# Patient Record
Sex: MALE | Race: WHITE | NOT HISPANIC OR LATINO | Employment: OTHER | URBAN - METROPOLITAN AREA
[De-identification: names, ages, dates, MRNs, and addresses within clinical notes are randomized per-mention and may not be internally consistent; named-entity substitution may affect disease eponyms.]

---

## 2017-08-05 ENCOUNTER — HOSPITAL ENCOUNTER (EMERGENCY)
Facility: HOSPITAL | Age: 62
Discharge: HOME | End: 2017-08-05
Attending: EMERGENCY MEDICINE
Payer: MEDICAID

## 2017-08-05 VITALS
OXYGEN SATURATION: 97 % | DIASTOLIC BLOOD PRESSURE: 67 MMHG | BODY MASS INDEX: 27.12 KG/M2 | WEIGHT: 200 LBS | HEART RATE: 96 BPM | TEMPERATURE: 99 F | SYSTOLIC BLOOD PRESSURE: 116 MMHG | RESPIRATION RATE: 19 BRPM

## 2017-08-05 DIAGNOSIS — I95.9 HYPOTENSION, UNSPECIFIED HYPOTENSION TYPE: ICD-10-CM

## 2017-08-05 DIAGNOSIS — J20.9 ACUTE BRONCHITIS, UNSPECIFIED ORGANISM: Primary | ICD-10-CM

## 2017-08-05 DIAGNOSIS — J44.1 COPD EXACERBATION: ICD-10-CM

## 2017-08-05 PROBLEM — I10 ESSENTIAL HYPERTENSION: Status: ACTIVE | Noted: 2017-08-05

## 2017-08-05 PROBLEM — E78.5 HYPERLIPIDEMIA: Status: ACTIVE | Noted: 2017-08-05

## 2017-08-05 PROBLEM — F17.200 SMOKER: Status: ACTIVE | Noted: 2017-08-05

## 2017-08-05 LAB
ALBUMIN SERPL BCP-MCNC: 3.3 G/DL
ALP SERPL-CCNC: 78 U/L
ALT SERPL W/O P-5'-P-CCNC: 19 U/L
ANION GAP SERPL CALC-SCNC: 11 MMOL/L
APTT BLDCRRT: 30.6 SEC
AST SERPL-CCNC: 32 U/L
BASOPHILS # BLD AUTO: 0.02 K/UL
BASOPHILS NFR BLD: 0.4 %
BILIRUB SERPL-MCNC: 0.2 MG/DL
BILIRUB UR QL STRIP: NEGATIVE
BNP SERPL-MCNC: 52 PG/ML
BNP SERPL-MCNC: 52 PG/ML
BUN SERPL-MCNC: 28 MG/DL
CALCIUM SERPL-MCNC: 8.3 MG/DL
CHLORIDE SERPL-SCNC: 103 MMOL/L
CLARITY UR: CLEAR
CO2 SERPL-SCNC: 24 MMOL/L
COLOR UR: YELLOW
CREAT SERPL-MCNC: 1.4 MG/DL
DIFFERENTIAL METHOD: ABNORMAL
EOSINOPHIL # BLD AUTO: 0 K/UL
EOSINOPHIL NFR BLD: 0.4 %
ERYTHROCYTE [DISTWIDTH] IN BLOOD BY AUTOMATED COUNT: 17.9 %
EST. GFR  (AFRICAN AMERICAN): >60 ML/MIN/1.73 M^2
EST. GFR  (NON AFRICAN AMERICAN): 53 ML/MIN/1.73 M^2
FLUAV AG SPEC QL IA: NEGATIVE
FLUBV AG SPEC QL IA: NEGATIVE
GLUCOSE SERPL-MCNC: 116 MG/DL
GLUCOSE UR QL STRIP: NEGATIVE
HCT VFR BLD AUTO: 36.3 %
HGB BLD-MCNC: 11.2 G/DL
HGB UR QL STRIP: NEGATIVE
INR PPP: 1.1
KETONES UR QL STRIP: NEGATIVE
LACTATE SERPL-SCNC: 0.7 MMOL/L
LACTATE SERPL-SCNC: 0.7 MMOL/L
LEUKOCYTE ESTERASE UR QL STRIP: NEGATIVE
LIPASE SERPL-CCNC: 68 U/L
LYMPHOCYTES # BLD AUTO: 1.9 K/UL
LYMPHOCYTES NFR BLD: 36.2 %
MAGNESIUM SERPL-MCNC: 1.8 MG/DL
MCH RBC QN AUTO: 26.9 PG
MCHC RBC AUTO-ENTMCNC: 30.9 G/DL
MCV RBC AUTO: 87 FL
MONOCYTES # BLD AUTO: 0.9 K/UL
MONOCYTES NFR BLD: 17 %
NEUTROPHILS # BLD AUTO: 2.4 K/UL
NEUTROPHILS NFR BLD: 46 %
NITRITE UR QL STRIP: NEGATIVE
PH UR STRIP: 6 [PH] (ref 5–8)
PHOSPHATE SERPL-MCNC: 3.7 MG/DL
PLATELET # BLD AUTO: 127 K/UL
PMV BLD AUTO: 10.5 FL
POTASSIUM SERPL-SCNC: 4.6 MMOL/L
PROT SERPL-MCNC: 6.8 G/DL
PROT UR QL STRIP: NEGATIVE
PROTHROMBIN TIME: 10.9 SEC
RBC # BLD AUTO: 4.16 M/UL
SODIUM SERPL-SCNC: 138 MMOL/L
SP GR UR STRIP: 1.02 (ref 1–1.03)
SPECIMEN SOURCE: NORMAL
TROPONIN I SERPL DL<=0.01 NG/ML-MCNC: 0.01 NG/ML
TSH SERPL DL<=0.005 MIU/L-ACNC: 2.1 UIU/ML
URN SPEC COLLECT METH UR: NORMAL
UROBILINOGEN UR STRIP-ACNC: NEGATIVE EU/DL
WBC # BLD AUTO: 5.22 K/UL

## 2017-08-05 PROCEDURE — 87040 BLOOD CULTURE FOR BACTERIA: CPT

## 2017-08-05 PROCEDURE — 83690 ASSAY OF LIPASE: CPT

## 2017-08-05 PROCEDURE — 87086 URINE CULTURE/COLONY COUNT: CPT

## 2017-08-05 PROCEDURE — 25000003 PHARM REV CODE 250: Performed by: EMERGENCY MEDICINE

## 2017-08-05 PROCEDURE — 84484 ASSAY OF TROPONIN QUANT: CPT

## 2017-08-05 PROCEDURE — 25000242 PHARM REV CODE 250 ALT 637 W/ HCPCS: Performed by: EMERGENCY MEDICINE

## 2017-08-05 PROCEDURE — 85610 PROTHROMBIN TIME: CPT

## 2017-08-05 PROCEDURE — 84443 ASSAY THYROID STIM HORMONE: CPT

## 2017-08-05 PROCEDURE — 87400 INFLUENZA A/B EACH AG IA: CPT

## 2017-08-05 PROCEDURE — 83735 ASSAY OF MAGNESIUM: CPT

## 2017-08-05 PROCEDURE — 83880 ASSAY OF NATRIURETIC PEPTIDE: CPT

## 2017-08-05 PROCEDURE — 93010 ELECTROCARDIOGRAM REPORT: CPT | Mod: ,,, | Performed by: INTERNAL MEDICINE

## 2017-08-05 PROCEDURE — 85730 THROMBOPLASTIN TIME PARTIAL: CPT

## 2017-08-05 PROCEDURE — 84100 ASSAY OF PHOSPHORUS: CPT

## 2017-08-05 PROCEDURE — 85025 COMPLETE CBC W/AUTO DIFF WBC: CPT

## 2017-08-05 PROCEDURE — 63600175 PHARM REV CODE 636 W HCPCS: Performed by: EMERGENCY MEDICINE

## 2017-08-05 PROCEDURE — 81003 URINALYSIS AUTO W/O SCOPE: CPT

## 2017-08-05 PROCEDURE — 83605 ASSAY OF LACTIC ACID: CPT

## 2017-08-05 PROCEDURE — 80053 COMPREHEN METABOLIC PANEL: CPT

## 2017-08-05 PROCEDURE — 96374 THER/PROPH/DIAG INJ IV PUSH: CPT

## 2017-08-05 PROCEDURE — 99285 EMERGENCY DEPT VISIT HI MDM: CPT | Mod: 25

## 2017-08-05 PROCEDURE — 94640 AIRWAY INHALATION TREATMENT: CPT

## 2017-08-05 PROCEDURE — 96361 HYDRATE IV INFUSION ADD-ON: CPT

## 2017-08-05 RX ORDER — GUAIFENESIN 600 MG/1
600 TABLET, EXTENDED RELEASE ORAL 2 TIMES DAILY
Status: DISCONTINUED | OUTPATIENT
Start: 2017-08-05 | End: 2017-08-05 | Stop reason: HOSPADM

## 2017-08-05 RX ORDER — LOVASTATIN 20 MG/1
40 TABLET ORAL NIGHTLY
Status: CANCELLED | OUTPATIENT
Start: 2017-08-05

## 2017-08-05 RX ORDER — LISINOPRIL 20 MG/1
20 TABLET ORAL DAILY
Status: CANCELLED | OUTPATIENT
Start: 2017-08-06

## 2017-08-05 RX ORDER — IPRATROPIUM BROMIDE AND ALBUTEROL SULFATE 2.5; .5 MG/3ML; MG/3ML
3 SOLUTION RESPIRATORY (INHALATION) EVERY 6 HOURS
Status: CANCELLED | OUTPATIENT
Start: 2017-08-05

## 2017-08-05 RX ORDER — FORMOTEROL FUMARATE DIHYDRATE 20 UG/2ML
20 SOLUTION RESPIRATORY (INHALATION) EVERY 12 HOURS
Status: CANCELLED | OUTPATIENT
Start: 2017-08-05

## 2017-08-05 RX ORDER — CARVEDILOL 12.5 MG/1
25 TABLET ORAL 2 TIMES DAILY WITH MEALS
Status: CANCELLED | OUTPATIENT
Start: 2017-08-05

## 2017-08-05 RX ORDER — PREDNISONE 20 MG/1
40 TABLET ORAL DAILY
Qty: 10 TABLET | Refills: 0 | Status: SHIPPED | OUTPATIENT
Start: 2017-08-05 | End: 2017-08-10

## 2017-08-05 RX ORDER — AMLODIPINE BESYLATE 10 MG/1
10 TABLET ORAL DAILY
COMMUNITY
End: 2018-05-14

## 2017-08-05 RX ORDER — IBUPROFEN 200 MG
1 TABLET ORAL DAILY
Status: DISCONTINUED | OUTPATIENT
Start: 2017-08-05 | End: 2017-08-05 | Stop reason: HOSPADM

## 2017-08-05 RX ORDER — SODIUM CHLORIDE 9 MG/ML
INJECTION, SOLUTION INTRAVENOUS CONTINUOUS
Status: CANCELLED | OUTPATIENT
Start: 2017-08-05

## 2017-08-05 RX ORDER — METHYLPREDNISOLONE SOD SUCC 125 MG
125 VIAL (EA) INJECTION
Status: COMPLETED | OUTPATIENT
Start: 2017-08-05 | End: 2017-08-05

## 2017-08-05 RX ORDER — CETIRIZINE HYDROCHLORIDE 10 MG/1
10 TABLET ORAL DAILY
Qty: 30 TABLET | Refills: 0 | Status: SHIPPED | OUTPATIENT
Start: 2017-08-05 | End: 2018-05-14

## 2017-08-05 RX ORDER — PANTOPRAZOLE SODIUM 40 MG/1
40 TABLET, DELAYED RELEASE ORAL DAILY
Status: CANCELLED | OUTPATIENT
Start: 2017-08-06

## 2017-08-05 RX ORDER — ACETAMINOPHEN 500 MG
1000 TABLET ORAL
Status: COMPLETED | OUTPATIENT
Start: 2017-08-05 | End: 2017-08-05

## 2017-08-05 RX ORDER — ACETAMINOPHEN 325 MG/1
650 TABLET ORAL EVERY 4 HOURS PRN
Status: CANCELLED | OUTPATIENT
Start: 2017-08-05

## 2017-08-05 RX ORDER — MOXIFLOXACIN HYDROCHLORIDE 400 MG/1
400 TABLET ORAL DAILY
Qty: 10 TABLET | Refills: 0 | Status: SHIPPED | OUTPATIENT
Start: 2017-08-05 | End: 2017-08-15

## 2017-08-05 RX ORDER — AMLODIPINE BESYLATE 5 MG/1
10 TABLET ORAL DAILY
Status: CANCELLED | OUTPATIENT
Start: 2017-08-06

## 2017-08-05 RX ORDER — SODIUM CHLORIDE 0.9 % (FLUSH) 0.9 %
3 SYRINGE (ML) INJECTION EVERY 8 HOURS
Status: CANCELLED | OUTPATIENT
Start: 2017-08-05

## 2017-08-05 RX ORDER — ENOXAPARIN SODIUM 100 MG/ML
40 INJECTION SUBCUTANEOUS EVERY 24 HOURS
Status: CANCELLED | OUTPATIENT
Start: 2017-08-05

## 2017-08-05 RX ORDER — IPRATROPIUM BROMIDE AND ALBUTEROL SULFATE 2.5; .5 MG/3ML; MG/3ML
3 SOLUTION RESPIRATORY (INHALATION)
Status: COMPLETED | OUTPATIENT
Start: 2017-08-05 | End: 2017-08-05

## 2017-08-05 RX ORDER — FAMOTIDINE 20 MG/1
20 TABLET, FILM COATED ORAL 2 TIMES DAILY
Status: CANCELLED | OUTPATIENT
Start: 2017-08-05

## 2017-08-05 RX ORDER — ALBUTEROL SULFATE 90 UG/1
1-2 AEROSOL, METERED RESPIRATORY (INHALATION) EVERY 6 HOURS PRN
Qty: 1 INHALER | Refills: 0 | Status: SHIPPED | OUTPATIENT
Start: 2017-08-05 | End: 2018-05-14

## 2017-08-05 RX ORDER — PANTOPRAZOLE SODIUM 40 MG/1
40 TABLET, DELAYED RELEASE ORAL DAILY
COMMUNITY
End: 2019-02-07

## 2017-08-05 RX ORDER — CLONIDINE HYDROCHLORIDE 0.1 MG/1
0.1 TABLET ORAL 2 TIMES DAILY
Status: CANCELLED | OUTPATIENT
Start: 2017-08-05

## 2017-08-05 RX ADMIN — IPRATROPIUM BROMIDE AND ALBUTEROL SULFATE 3 ML: .5; 3 SOLUTION RESPIRATORY (INHALATION) at 06:08

## 2017-08-05 RX ADMIN — METHYLPREDNISOLONE SODIUM SUCCINATE 125 MG: 125 INJECTION, POWDER, FOR SOLUTION INTRAMUSCULAR; INTRAVENOUS at 04:08

## 2017-08-05 RX ADMIN — ACETAMINOPHEN 1000 MG: 500 TABLET ORAL at 04:08

## 2017-08-05 RX ADMIN — SODIUM CHLORIDE 2721 ML: 0.9 INJECTION, SOLUTION INTRAVENOUS at 04:08

## 2017-08-05 NOTE — ED NOTES
Np at bedside at this time. Patient claims he does not want to be admitted into the hospital. Patient is now being discharged ama

## 2017-08-05 NOTE — ASSESSMENT & PLAN NOTE
oxygen therapy to maintain sats > 92 %, formoterol treatments, Solumedrol 80 mg IV every 8 hours, Avelox and Duo Nebs  Advised smoking cessation  Will need home nebulizer - consult  for assistance

## 2017-08-05 NOTE — ED PROVIDER NOTES
SCRIBE #1 NOTE: I, Dasha Ryder, am scribing for, and in the presence of, Jose Alvarez MD. I have scribed the entire note.      History      Chief Complaint   Patient presents with    Shortness of Breath     increased shortness of breath, body aches since being diagnosed with pneumonia 2 weeks ago       Review of patient's allergies indicates:  No Known Allergies     HPI   HPI    8/5/2017, 3:56 PM   History obtained from the patient      History of Present Illness: Filemon Aranda is a 61 y.o. male patient who presents to the Emergency Department for SOB which onset gradually yesterday. Symptoms are constant and moderate in severity. Pt reports that he had an episode of watery diarrhea. Recently dx with pneumonia. Pt recently finished Levaquin. No mitigating or exacerbating factors reported. Associated sxs include intermittent chills, myalgias (mostly in torso), and diarrhea. Pt reports that he has been using his nebulizer and he took all of his BP medications today. Patient denies any fever, cough, leg swelling, palpitations, nausea, vomiting, abdominal pain, CP, dizziness, and all other sxs at this time. No further complaints or concerns at this time.         Arrival mode: Personal vehicle    PCP: Jacob Ferreira MD       Past Medical History:  Past Medical History:   Diagnosis Date    COPD (chronic obstructive pulmonary disease)     Hypercholesteremia     Hypertension     Insomnia     Renal disorder     kidney stones       Past Surgical History:  Past Surgical History:   Procedure Laterality Date    KIDNEY STONE SURGERY           Family History:  Family History   Problem Relation Age of Onset    Stroke Mother     Heart disease Father     Hypertension Father     Dementia Father     Heart disease Brother     Diabetes Brother     Heart disease Brother     Hypertension Brother        Social History:  Social History     Social History Main Topics    Smoking status: Current Every Day Smoker      Packs/day: 0.50     Types: Cigarettes    Smokeless tobacco: Never Used    Alcohol use Yes    Drug use: No    Sexual activity: Not given       ROS   Review of Systems   Constitutional: Positive for chills. Negative for diaphoresis and fever.   HENT: Negative for sore throat.    Respiratory: Positive for cough (productive), shortness of breath and wheezing.    Cardiovascular: Negative for chest pain and leg swelling.   Gastrointestinal: Positive for diarrhea. Negative for abdominal pain, nausea and vomiting.   Genitourinary: Negative for dysuria.   Musculoskeletal: Negative for back pain.   Skin: Negative for rash.   Neurological: Negative for dizziness, weakness and numbness.   Hematological: Does not bruise/bleed easily.       Physical Exam      Initial Vitals   BP Pulse Resp Temp SpO2   08/05/17 1530 08/05/17 1530 08/05/17 1530 08/05/17 1537 08/05/17 1530   101/67 103 (!) 22 98.5 °F (36.9 °C) 100 %      MAP       08/05/17 1530       78.33          Physical Exam  Nursing Notes and Vital Signs Reviewed.  Constitutional: Patient is in no apparent distress. Well-developed and well-nourished.  Head: Atraumatic. Normocephalic.  Eyes: PERRL. EOM intact. Conjunctivae are not pale. No scleral icterus.  ENT: Mucous membranes are moist. Oropharynx is clear and symmetric.    Neck: Supple. Full ROM. No lymphadenopathy.  Cardiovascular: Regular rate. Regular rhythm. No murmurs, rubs, or gallops. Distal pulses are 2+ and symmetric.  Pulmonary/Chest: Expiratory wheezes bilaterally with poor air movement. No rhonchi no rales.   Abdominal: Soft and non-distended.  There is no tenderness.  No rebound, guarding, or rigidity. Good bowel sounds.  Genitourinary: No CVA tenderness  Musculoskeletal: Moves all extremities. No obvious deformities. No edema. No calf tenderness.  Skin: Warm and dry.  Neurological:  Alert, awake, and appropriate.  Normal speech.  No acute focal neurological deficits are appreciated.  Psychiatric: Normal  affect. Good eye contact. Appropriate in content.    ED Course    Procedures  ED Vital Signs:  Vitals:    08/05/17 1530 08/05/17 1537 08/05/17 1541 08/05/17 1617   BP: 101/67   105/60   Pulse: 103   101   Resp: (!) 22   17   Temp:  98.5 °F (36.9 °C)     SpO2: 100%   98%   Weight:   90.7 kg (200 lb)     08/05/17 1632 08/05/17 1702 08/05/17 1732 08/05/17 1815   BP: 109/61 (!) 123/91 119/63    Pulse: 92 91 95 96   Resp: 19  20 14   Temp:       SpO2: 100% 100% 99% 96%   Weight:        08/05/17 1820 08/05/17 1824 08/05/17 1902   BP:   116/67   Pulse: 99 98 96   Resp: 14 14 19   Temp:      SpO2:   97%   Weight:          Abnormal Lab Results:  Labs Reviewed   CBC W/ AUTO DIFFERENTIAL - Abnormal; Notable for the following:        Result Value    RBC 4.16 (*)     Hemoglobin 11.2 (*)     Hematocrit 36.3 (*)     MCH 26.9 (*)     MCHC 30.9 (*)     RDW 17.9 (*)     Platelets 127 (*)     Mono% 17.0 (*)     All other components within normal limits   COMPREHENSIVE METABOLIC PANEL - Abnormal; Notable for the following:     Glucose 116 (*)     BUN, Bld 28 (*)     Calcium 8.3 (*)     Albumin 3.3 (*)     eGFR if non  53 (*)     All other components within normal limits   LIPASE - Abnormal; Notable for the following:     Lipase 68 (*)     All other components within normal limits   CULTURE, BLOOD   CULTURE, BLOOD   CULTURE, URINE   LACTIC ACID, PLASMA   TROPONIN I   B-TYPE NATRIURETIC PEPTIDE   APTT   B-TYPE NATRIURETIC PEPTIDE   LACTIC ACID, PLASMA   MAGNESIUM   PHOSPHORUS   PROTIME-INR   TSH   URINALYSIS   INFLUENZA A AND B ANTIGEN        All Lab Results:  Results for orders placed or performed during the hospital encounter of 08/05/17   CBC auto differential   Result Value Ref Range    WBC 5.22 3.90 - 12.70 K/uL    RBC 4.16 (L) 4.60 - 6.20 M/uL    Hemoglobin 11.2 (L) 14.0 - 18.0 g/dL    Hematocrit 36.3 (L) 40.0 - 54.0 %    MCV 87 82 - 98 fL    MCH 26.9 (L) 27.0 - 31.0 pg    MCHC 30.9 (L) 32.0 - 36.0 g/dL    RDW 17.9  (H) 11.5 - 14.5 %    Platelets 127 (L) 150 - 350 K/uL    MPV 10.5 9.2 - 12.9 fL    Gran # 2.4 1.8 - 7.7 K/uL    Lymph # 1.9 1.0 - 4.8 K/uL    Mono # 0.9 0.3 - 1.0 K/uL    Eos # 0.0 0.0 - 0.5 K/uL    Baso # 0.02 0.00 - 0.20 K/uL    Gran% 46.0 38.0 - 73.0 %    Lymph% 36.2 18.0 - 48.0 %    Mono% 17.0 (H) 4.0 - 15.0 %    Eosinophil% 0.4 0.0 - 8.0 %    Basophil% 0.4 0.0 - 1.9 %    Differential Method Automated    Comprehensive metabolic panel   Result Value Ref Range    Sodium 138 136 - 145 mmol/L    Potassium 4.6 3.5 - 5.1 mmol/L    Chloride 103 95 - 110 mmol/L    CO2 24 23 - 29 mmol/L    Glucose 116 (H) 70 - 110 mg/dL    BUN, Bld 28 (H) 8 - 23 mg/dL    Creatinine 1.4 0.5 - 1.4 mg/dL    Calcium 8.3 (L) 8.7 - 10.5 mg/dL    Total Protein 6.8 6.0 - 8.4 g/dL    Albumin 3.3 (L) 3.5 - 5.2 g/dL    Total Bilirubin 0.2 0.1 - 1.0 mg/dL    Alkaline Phosphatase 78 55 - 135 U/L    AST 32 10 - 40 U/L    ALT 19 10 - 44 U/L    Anion Gap 11 8 - 16 mmol/L    eGFR if African American >60 >60 mL/min/1.73 m^2    eGFR if non African American 53 (A) >60 mL/min/1.73 m^2   Lactic acid, plasma   Result Value Ref Range    Lactate (Lactic Acid) 0.7 0.5 - 2.2 mmol/L   Troponin I   Result Value Ref Range    Troponin I 0.011 0.000 - 0.026 ng/mL   B-Type natriuretic peptide (BNP)   Result Value Ref Range    BNP 52 0 - 99 pg/mL   APTT   Result Value Ref Range    aPTT 30.6 21.0 - 32.0 sec   Brain natriuretic peptide   Result Value Ref Range    BNP 52 0 - 99 pg/mL   Lactic acid, plasma #1   Result Value Ref Range    Lactate (Lactic Acid) 0.7 0.5 - 2.2 mmol/L   Lipase   Result Value Ref Range    Lipase 68 (H) 4 - 60 U/L   Magnesium   Result Value Ref Range    Magnesium 1.8 1.6 - 2.6 mg/dL   Phosphorus   Result Value Ref Range    Phosphorus 3.7 2.7 - 4.5 mg/dL   Protime-INR   Result Value Ref Range    Prothrombin Time 10.9 9.0 - 12.5 sec    INR 1.1 0.8 - 1.2   TSH   Result Value Ref Range    TSH 2.099 0.400 - 4.000 uIU/mL   Urinalysis   Result Value Ref  Range    Specimen UA Urine, Clean Catch     Color, UA Yellow Yellow, Straw, Sarah    Appearance, UA Clear Clear    pH, UA 6.0 5.0 - 8.0    Specific Gravity, UA 1.025 1.005 - 1.030    Protein, UA Negative Negative    Glucose, UA Negative Negative    Ketones, UA Negative Negative    Bilirubin (UA) Negative Negative    Occult Blood UA Negative Negative    Nitrite, UA Negative Negative    Urobilinogen, UA Negative <2.0 EU/dL    Leukocytes, UA Negative Negative   Influenza antigen Nasopharyngeal Swab   Result Value Ref Range    Influenza A Ag, EIA Negative Negative    Influenza B Ag, EIA Negative Negative    Flu A & B Source Nasopharyngeal Swab          Imaging Results:  Imaging Results          X-Ray Chest AP Portable (SOB) (Final result)  Result time 08/05/17 16:49:01    Final result by Isabela Vann MD (08/05/17 16:49:01)                 Impression:     No acute cardiopulmonary disease.            Electronically signed by: ISABELA VANN MD  Date:     08/05/17  Time:    16:49              Narrative:    Exam: XR CHEST AP PORTABLE    Clinical History: Shortness of breath. SOB    Findings:     The lungs are clear. The cardiac silhouette is within normal limits.                             The EKG was ordered, reviewed, and independently interpreted by the ED provider.  Interpretation time: 15:37  Rate: 107 BPM  Rhythm: sinus tachycardia  Interpretation: Non specific ST and T wave abnorrmalities. No STEMI.             The Emergency Provider reviewed the vital signs and test results, which are outlined above.    ED Discussion     5:15 PM: Re-evaluated pt. I have discussed test results, shared treatment plan, and the need for admission with patient and family at bedside. Pt and family express understanding at this time and agree with all information. All questions answered. Pt and family have no further questions or concerns at this time. Pt is ready for admit.      6:15 PM: Discussed case with Rosalba ([facility]).  Dr. Sanders agrees with current care and management of pt and accepts admission.   Admitting Service: Hospital medicine   Admitting Physician: Dr. Sanders  Admit to: OBS-Tele    6:48 PM: Pt is A&O x3, appropriate and competent at this time. Pt voices desire to leave hospital. D/w pt in length need for further evaluation and treatment due to HPI and PEx. Concern over pt feeling ill with low BP and possible sepsis discussed.  HM in the ED evaluating pt and agrees he needs to stay.  Pt declines any further evaluation or tx at this time - wife and children want him to stay but he refuses. All risks, including worsening sx, permanent bodily harm and death, were discussed in length. Pt acknowledges all risk at this time and agrees to sign AMA form. Pt given RTER instructions. All questions and concerns addressed at this time. Pt leaving AMA.             ED Medication(s):  Medications   sodium chloride 0.9% bolus 2,721 mL (0 mL/kg × 90.7 kg Intravenous Stopped 8/5/17 1656)   albuterol-ipratropium 2.5mg-0.5mg/3mL nebulizer solution 3 mL (3 mLs Nebulization Given 8/5/17 7650)   methylPREDNISolone sodium succinate injection 125 mg (125 mg Intravenous Given 8/5/17 1622)   acetaminophen tablet 1,000 mg (1,000 mg Oral Given 8/5/17 1622)       Discharge Medication List as of 8/5/2017  7:00 PM      START taking these medications    Details   albuterol 90 mcg/actuation inhaler Inhale 1-2 puffs into the lungs every 6 (six) hours as needed for Wheezing (coughing)., Starting Sat 8/5/2017, Until Sun 8/5/2018, Print      cetirizine (ZYRTEC) 10 MG tablet Take 1 tablet (10 mg total) by mouth once daily., Starting Sat 8/5/2017, Until Sun 8/5/2018, Print      moxifloxacin (AVELOX) 400 mg tablet Take 1 tablet (400 mg total) by mouth once daily., Starting Sat 8/5/2017, Until Tue 8/15/2017, Print      predniSONE (DELTASONE) 20 MG tablet Take 2 tablets (40 mg total) by mouth once daily., Starting Sat 8/5/2017, Until Thu 8/10/2017, Print              Follow-up Information     Jacob Ferreira MD. Schedule an appointment as soon as possible for a visit in 2 days.    Specialty:  Family Medicine  Why:  Follow up with your primary doctor in the next 2-3 days for a re-check.  Return to the emergency department for any worsening signs or symptoms.  Contact information:  [address]  [facility] [address]  [phone]                     Medical Decision Making    Medical Decision Making:   Clinical Tests:   Lab Tests: Reviewed and Ordered  Radiological Study: Reviewed and Ordered  Medical Tests: Reviewed and Ordered           Scribe Attestation:   Scribe #1: I performed the above scribed service and the documentation accurately describes the services I performed. I attest to the accuracy of the note.    Attending:   Physician Attestation Statement for Scribe #1: I, Jose Alvarez MD, personally performed the services described in this documentation, as scribed by Dasha Ryder, in my presence, and it is both accurate and complete.          Clinical Impression       ICD-10-CM ICD-9-CM   1. Acute bronchitis, unspecified organism J20.9 466.0   2. COPD exacerbation J44.1 491.21   3. Hypotension, unspecified hypotension type I95.9 458.9       Disposition:   Disposition: AMA  Condition: Fair         Jose Alvarez MD  08/06/17 1027

## 2017-08-07 LAB — BACTERIA UR CULT: NO GROWTH

## 2017-08-11 LAB
BACTERIA BLD CULT: NORMAL
BACTERIA BLD CULT: NORMAL

## 2018-05-08 ENCOUNTER — HOSPITAL ENCOUNTER (EMERGENCY)
Facility: HOSPITAL | Age: 63
Discharge: HOME | End: 2018-05-08
Attending: EMERGENCY MEDICINE
Payer: MEDICAID

## 2018-05-08 VITALS
DIASTOLIC BLOOD PRESSURE: 74 MMHG | TEMPERATURE: 99 F | HEART RATE: 87 BPM | SYSTOLIC BLOOD PRESSURE: 153 MMHG | OXYGEN SATURATION: 98 % | HEIGHT: 71 IN | RESPIRATION RATE: 22 BRPM

## 2018-05-08 DIAGNOSIS — S50.01XA CONTUSION OF RIGHT ELBOW, INITIAL ENCOUNTER: Primary | ICD-10-CM

## 2018-05-08 DIAGNOSIS — M25.561 RIGHT KNEE PAIN: ICD-10-CM

## 2018-05-08 DIAGNOSIS — S80.01XA CONTUSION OF RIGHT KNEE, INITIAL ENCOUNTER: ICD-10-CM

## 2018-05-08 DIAGNOSIS — M25.521 RIGHT ELBOW PAIN: ICD-10-CM

## 2018-05-08 DIAGNOSIS — J40 BRONCHITIS: ICD-10-CM

## 2018-05-08 DIAGNOSIS — M25.571 RIGHT ANKLE PAIN: ICD-10-CM

## 2018-05-08 DIAGNOSIS — E86.0 DEHYDRATION: ICD-10-CM

## 2018-05-08 DIAGNOSIS — S96.911A STRAIN OF RIGHT ANKLE, INITIAL ENCOUNTER: ICD-10-CM
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
## 2018-05-08 DIAGNOSIS — I95.9 HYPOTENSION: ICD-10-CM

## 2018-05-08 LAB
ALBUMIN SERPL BCP-MCNC: 3.8 G/DL
ALP SERPL-CCNC: 85 U/L
ALT SERPL W/O P-5'-P-CCNC: 13 U/L
ANION GAP SERPL CALC-SCNC: 8 MMOL/L
ANISOCYTOSIS BLD QL SMEAR: SLIGHT
AST SERPL-CCNC: 22 U/L
BASOPHILS # BLD AUTO: 0.04 K/UL
BASOPHILS NFR BLD: 0.3 %
BILIRUB SERPL-MCNC: 0.7 MG/DL
BNP SERPL-MCNC: 49 PG/ML
BUN SERPL-MCNC: 32 MG/DL
CALCIUM SERPL-MCNC: 8.8 MG/DL
CHLORIDE SERPL-SCNC: 100 MMOL/L
CO2 SERPL-SCNC: 29 MMOL/L
CREAT SERPL-MCNC: 1.9 MG/DL
DIFFERENTIAL METHOD: ABNORMAL
EOSINOPHIL # BLD AUTO: 0.1 K/UL
EOSINOPHIL NFR BLD: 1.1 %
ERYTHROCYTE [DISTWIDTH] IN BLOOD BY AUTOMATED COUNT: 18.9 %
EST. GFR  (AFRICAN AMERICAN): 42 ML/MIN/1.73 M^2
EST. GFR  (NON AFRICAN AMERICAN): 37 ML/MIN/1.73 M^2
GLUCOSE SERPL-MCNC: 105 MG/DL (ref 70–110)
GLUCOSE SERPL-MCNC: 107 MG/DL
HCT VFR BLD AUTO: 32.1 %
HGB BLD-MCNC: 8.7 G/DL
HYPOCHROMIA BLD QL SMEAR: ABNORMAL
LYMPHOCYTES # BLD AUTO: 2.3 K/UL
LYMPHOCYTES NFR BLD: 17 %
MCH RBC QN AUTO: 21.4 PG
MCHC RBC AUTO-ENTMCNC: 27.1 G/DL
MCV RBC AUTO: 79 FL
MONOCYTES # BLD AUTO: 2.1 K/UL
MONOCYTES NFR BLD: 15.4 %
NEUTROPHILS # BLD AUTO: 8.8 K/UL
NEUTROPHILS NFR BLD: 66.2 %
OVALOCYTES BLD QL SMEAR: ABNORMAL
PLATELET # BLD AUTO: 235 K/UL
PLATELET BLD QL SMEAR: ABNORMAL
PMV BLD AUTO: 10 FL
POCT GLUCOSE: 105 MG/DL (ref 70–110)
POIKILOCYTOSIS BLD QL SMEAR: SLIGHT
POLYCHROMASIA BLD QL SMEAR: ABNORMAL
POTASSIUM SERPL-SCNC: 4.8 MMOL/L
PROT SERPL-MCNC: 7.5 G/DL
RBC # BLD AUTO: 4.07 M/UL
SODIUM SERPL-SCNC: 137 MMOL/L
STOMATOCYTES BLD QL SMEAR: PRESENT
TARGETS BLD QL SMEAR: ABNORMAL
TROPONIN I SERPL DL<=0.01 NG/ML-MCNC: <0.006 NG/ML
TROPONIN I SERPL DL<=0.01 NG/ML-MCNC: <0.006 NG/ML
WBC # BLD AUTO: 13.27 K/UL

## 2018-05-08 PROCEDURE — 96360 HYDRATION IV INFUSION INIT: CPT

## 2018-05-08 PROCEDURE — 82962 GLUCOSE BLOOD TEST: CPT

## 2018-05-08 PROCEDURE — 94640 AIRWAY INHALATION TREATMENT: CPT

## 2018-05-08 PROCEDURE — 84484 ASSAY OF TROPONIN QUANT: CPT | Mod: 91

## 2018-05-08 PROCEDURE — 93010 ELECTROCARDIOGRAM REPORT: CPT | Mod: ,,, | Performed by: INTERNAL MEDICINE

## 2018-05-08 PROCEDURE — 25000003 PHARM REV CODE 250: Performed by: EMERGENCY MEDICINE

## 2018-05-08 PROCEDURE — 25000242 PHARM REV CODE 250 ALT 637 W/ HCPCS: Performed by: EMERGENCY MEDICINE

## 2018-05-08 PROCEDURE — 85025 COMPLETE CBC W/AUTO DIFF WBC: CPT

## 2018-05-08 PROCEDURE — 99285 EMERGENCY DEPT VISIT HI MDM: CPT | Mod: 25

## 2018-05-08 PROCEDURE — 93005 ELECTROCARDIOGRAM TRACING: CPT

## 2018-05-08 PROCEDURE — 83880 ASSAY OF NATRIURETIC PEPTIDE: CPT

## 2018-05-08 PROCEDURE — 80053 COMPREHEN METABOLIC PANEL: CPT

## 2018-05-08 RX ORDER — IPRATROPIUM BROMIDE AND ALBUTEROL SULFATE 2.5; .5 MG/3ML; MG/3ML
3 SOLUTION RESPIRATORY (INHALATION)
Status: COMPLETED | OUTPATIENT
Start: 2018-05-08 | End: 2018-05-08

## 2018-05-08 RX ORDER — AZITHROMYCIN 250 MG/1
TABLET, FILM COATED ORAL
Qty: 6 TABLET | Refills: 0 | Status: SHIPPED | OUTPATIENT
Start: 2018-05-08 | End: 2018-05-14

## 2018-05-08 RX ORDER — METHYLPREDNISOLONE 4 MG/1
TABLET ORAL
Qty: 1 PACKAGE | Refills: 0 | Status: SHIPPED | OUTPATIENT
Start: 2018-05-08 | End: 2018-05-14

## 2018-05-08 RX ORDER — ACETAMINOPHEN 500 MG
1000 TABLET ORAL
Status: COMPLETED | OUTPATIENT
Start: 2018-05-08 | End: 2018-05-08

## 2018-05-08 RX ORDER — ASPIRIN 325 MG
325 TABLET ORAL
Status: COMPLETED | OUTPATIENT
Start: 2018-05-08 | End: 2018-05-08

## 2018-05-08 RX ADMIN — ACETAMINOPHEN 1000 MG: 500 TABLET, FILM COATED ORAL at 11:05

## 2018-05-08 RX ADMIN — ASPIRIN 325 MG ORAL TABLET 325 MG: 325 PILL ORAL at 11:05

## 2018-05-08 RX ADMIN — IPRATROPIUM BROMIDE AND ALBUTEROL SULFATE 3 ML: .5; 3 SOLUTION RESPIRATORY (INHALATION) at 11:05

## 2018-05-08 RX ADMIN — SODIUM CHLORIDE 500 ML: 0.9 INJECTION, SOLUTION INTRAVENOUS at 11:05

## 2018-05-08 NOTE — ED PROVIDER NOTES
"SCRIBE #1 NOTE: I, Corinne Mack, am scribing for, and in the presence of, Thiago Bee Jr., MD. I have scribed the entire note.      History      Chief Complaint   Patient presents with    Hypotension     hypotension and syncope last evening.        Review of patient's allergies indicates:  No Known Allergies     HPI   HPI    5/8/2018, 11:08 AM   History obtained from the patient      History of Present Illness: Filemon Aranda is a 62 y.o. male patient with PMHx of COPD and HTN who presents to the Emergency Department for LOC x2 which onset suddenly yesterday. Pt reports that he was walking yesterday when he "blacked out" and fell onto his R side. Pt's wife helped him up and tried to get him to the bathroom and the pt "blacked out again half way down the hallway". Pt's wife called 911 and EMS found that the pt was hypotensive at 60/30. Pt refused to seek further medical Tx at that time. Symptoms are episodic and moderate in severity. No mitigating or exacerbating factors reported. Associated sxs include R ankle pain, R foot pain, R elbow pain, and R knee pain. Patient denies any fever, chills, CP, SOB, N/V/D, back pain, neck pain, HA, dizziness, and all other sxs at this time. No prior Tx reported. Pt takes multiple BC powders daily. No further complaints or concerns at this time.         Arrival mode: Personal vehicle    PCP: Jacob Ferreira MD       Past Medical History:  Past Medical History:   Diagnosis Date    COPD (chronic obstructive pulmonary disease)     Hypercholesteremia     Hypertension     Insomnia     Renal disorder     kidney stones       Past Surgical History:  Past Surgical History:   Procedure Laterality Date    KIDNEY STONE SURGERY           Family History:  Family History   Problem Relation Age of Onset    Stroke Mother     Heart disease Father     Hypertension Father     Dementia Father     Heart disease Brother     Diabetes Brother     Heart disease Brother     Hypertension " Brother        Social History:  Social History     Social History Main Topics    Smoking status: Current Every Day Smoker     Packs/day: 0.50     Types: Cigarettes    Smokeless tobacco: Never Used    Alcohol use Yes    Drug use: No    Sexual activity: Not on file       ROS   Review of Systems   Constitutional: Negative for chills and fever.   HENT: Negative for facial swelling.         (-) head injury   Respiratory: Negative for cough and shortness of breath.    Cardiovascular: Negative for chest pain and leg swelling.        (+) hypotension   Gastrointestinal: Negative for abdominal pain, diarrhea, nausea and vomiting.   Musculoskeletal: Positive for arthralgias (R ankle; R knee; R elbow) and myalgias (R foot). Negative for back pain, neck pain and neck stiffness.   Skin: Negative for rash and wound.   Neurological: Negative for dizziness, light-headedness, numbness and headaches.        (+) LOC x2   All other systems reviewed and are negative.    Physical Exam      Initial Vitals [05/08/18 1046]   BP Pulse Resp Temp SpO2   118/66 107 20 98.8 °F (37.1 °C) 95 %      MAP       83.33          Physical Exam  Nursing Notes and Vital Signs Reviewed.  Constitutional: Patient is in no apparent distress. Well-developed and well-nourished.  Head: Atraumatic. Normocephalic.  Eyes: PERRL. EOM intact. Conjunctivae are not pale. No scleral icterus.  ENT: Mucous membranes are moist. Oropharynx is clear and symmetric.    Neck: Supple. Full ROM. No lymphadenopathy.  Cardiovascular: Tachycardic. Regular rhythm. No murmurs, rubs, or gallops. Distal pulses are 2+ and symmetric.  Pulmonary/Chest: No respiratory distress. Diffuse wheezes bilaterally. No rales.  Abdominal: Soft and non-distended.  There is no tenderness.  No rebound, guarding, or rigidity.   Musculoskeletal: Moves all extremities. No obvious deformities. No edema. No calf tenderness. Mild edema to the lateral aspect of the R ankle with LROM secondary to pain.  "Bruising to the R forearm. Superficial abrasions to the R elbow. No deep lacerations requiring sutures or active bleeding. Pt is grossly neurovascularly intact with brisk capillary refill.  Skin: Warm and dry.  Neurological:  Alert, awake, and appropriate.  Normal speech.  No acute focal neurological deficits are appreciated.  Psychiatric: Normal affect. Good eye contact. Appropriate in content.    ED Course    Procedures  ED Vital Signs:  Vitals:    05/08/18 1046 05/08/18 1147 05/08/18 1158 05/08/18 1159   BP: 118/66  (!) 125/57 136/60   Pulse: 107 97 92 96   Resp: 20 16     Temp: 98.8 °F (37.1 °C)      TempSrc: Oral      SpO2: 95% 97%     Height: 5' 11" (1.803 m)       05/08/18 1202 05/08/18 1230 05/08/18 1446   BP: (!) 102/56 (!) 153/74    Pulse: 104 90 87   Resp:  (!) 25 (!) 22   Temp:      TempSrc:      SpO2:  97% 98%   Height:          Abnormal Lab Results:  Labs Reviewed   CBC W/ AUTO DIFFERENTIAL - Abnormal; Notable for the following:        Result Value    WBC 13.27 (*)     RBC 4.07 (*)     Hemoglobin 8.7 (*)     Hematocrit 32.1 (*)     MCV 79 (*)     MCH 21.4 (*)     MCHC 27.1 (*)     RDW 18.9 (*)     Gran # (ANC) 8.8 (*)     Mono # 2.1 (*)     Lymph% 17.0 (*)     Mono% 15.4 (*)     All other components within normal limits   COMPREHENSIVE METABOLIC PANEL - Abnormal; Notable for the following:     BUN, Bld 32 (*)     Creatinine 1.9 (*)     eGFR if  42 (*)     eGFR if non  37 (*)     All other components within normal limits   POCT GLUCOSE MONITORING CONTINUOUS - Normal   TROPONIN I   TROPONIN I   B-TYPE NATRIURETIC PEPTIDE   POCT GLUCOSE        All Lab Results:  Results for orders placed or performed during the hospital encounter of 05/08/18   CBC auto differential   Result Value Ref Range    WBC 13.27 (H) 3.90 - 12.70 K/uL    RBC 4.07 (L) 4.60 - 6.20 M/uL    Hemoglobin 8.7 (L) 14.0 - 18.0 g/dL    Hematocrit 32.1 (L) 40.0 - 54.0 %    MCV 79 (L) 82 - 98 fL    MCH 21.4 (L) " 27.0 - 31.0 pg    MCHC 27.1 (L) 32.0 - 36.0 g/dL    RDW 18.9 (H) 11.5 - 14.5 %    Platelets 235 150 - 350 K/uL    MPV 10.0 9.2 - 12.9 fL    Gran # (ANC) 8.8 (H) 1.8 - 7.7 K/uL    Lymph # 2.3 1.0 - 4.8 K/uL    Mono # 2.1 (H) 0.3 - 1.0 K/uL    Eos # 0.1 0.0 - 0.5 K/uL    Baso # 0.04 0.00 - 0.20 K/uL    Gran% 66.2 38.0 - 73.0 %    Lymph% 17.0 (L) 18.0 - 48.0 %    Mono% 15.4 (H) 4.0 - 15.0 %    Eosinophil% 1.1 0.0 - 8.0 %    Basophil% 0.3 0.0 - 1.9 %    Platelet Estimate Appears normal     Aniso Slight     Poik Slight     Poly Occasional     Hypo Marked     Ovalocytes Occasional     Target Cells Occasional     Stomatocytes Present     Differential Method Automated    Comprehensive metabolic panel   Result Value Ref Range    Sodium 137 136 - 145 mmol/L    Potassium 4.8 3.5 - 5.1 mmol/L    Chloride 100 95 - 110 mmol/L    CO2 29 23 - 29 mmol/L    Glucose 107 70 - 110 mg/dL    BUN, Bld 32 (H) 8 - 23 mg/dL    Creatinine 1.9 (H) 0.5 - 1.4 mg/dL    Calcium 8.8 8.7 - 10.5 mg/dL    Total Protein 7.5 6.0 - 8.4 g/dL    Albumin 3.8 3.5 - 5.2 g/dL    Total Bilirubin 0.7 0.1 - 1.0 mg/dL    Alkaline Phosphatase 85 55 - 135 U/L    AST 22 10 - 40 U/L    ALT 13 10 - 44 U/L    Anion Gap 8 8 - 16 mmol/L    eGFR if African American 42 (A) >60 mL/min/1.73 m^2    eGFR if non African American 37 (A) >60 mL/min/1.73 m^2   Troponin I #1   Result Value Ref Range    Troponin I <0.006 0.000 - 0.026 ng/mL   Troponin I #2   Result Value Ref Range    Troponin I <0.006 0.000 - 0.026 ng/mL   B-Type natriuretic peptide (BNP)   Result Value Ref Range    BNP 49 0 - 99 pg/mL   POCT glucose   Result Value Ref Range    POC Glucose 105 70 - 110 mg/dL   POCT glucose   Result Value Ref Range    POCT Glucose 105 70 - 110 mg/dL       Imaging Results:  Imaging Results          CT Cervical Spine Without Contrast (Final result)  Result time 05/08/18 13:31:42    Final result by Kee Vazquez MD (05/08/18 13:31:42)                 Impression:      Degenerative  changes as above.    Small amount of fluid is seen within the inferior right mastoid air cells without a discrete fracture identified.  Findings could reflect chronic mastoid air cell disease.    All CT scans at this facility use dose modulation, iterative reconstruction, and/or weight base dosing when appropriate to reduce radiation dose to as low as reasonably achievable.      Electronically signed by: Kee Vazquez MD  Date:    05/08/2018  Time:    13:31             Narrative:    EXAMINATION:  CT CERVICAL SPINE WITHOUT CONTRAST    CLINICAL HISTORY:  fall;    TECHNIQUE:  2.5 mm axial noncontrast CT images were obtained through the cervical spine using soft tissue and bony out rhythms.  Sagittal and coronal reformats were also submitted for interpretation.    COMPARISON:  CT head 05/08/2018    FINDINGS:  There is reversal of the normal cervical lordosis which can be seen with patient positioning or muscular spasm.  Vertebral body heights are well-maintained. Intervertebral disc space height is maintained. No fractures are identified. Prevertebral soft tissues are unremarkable.    Small amount of fluid is seen within the inferior right mastoid air cells without a discrete fracture identified.  Findings could reflect chronic mastoid air cell disease.    There is multi-level degenerative disc disease with the greatest level of involvement being the C4/5 through C6/7 levels with small posterior disc osteophyte complexes and uncovertebral hypertrophy    Please note that routine CT of the spine is limited in its evaluation of extradural/epidural disease.                               CT Head Without Contrast (Final result)  Result time 05/08/18 13:20:34    Final result by Kee Vazquez MD (05/08/18 13:20:34)                 Impression:      Chronic microvascular ischemic changes.    Small amount of fluid is seen within the inferior right mastoid air cells without a discrete fracture identified.  Findings could reflect  chronic mastoid air cell disease.      Electronically signed by: Kee Vazquez MD  Date:    05/08/2018  Time:    13:20             Narrative:    EXAMINATION:  CT HEAD WITHOUT CONTRAST    CLINICAL HISTORY:  fall; Unspecified fall, initial encounter    TECHNIQUE:  Low dose axial CT images obtained throughout the head without intravenous contrast. Sagittal and coronal reconstructions were performed.  All CT scans at this facility use dose modulation, iterative reconstruction and/or weight based dosing when appropriate to reduce radiation dose to as low as reasonably achievable.    COMPARISON:  None.    FINDINGS:  Intracranial compartment:    The brain parenchyma demonstrates areas of decreased attenuation with mild to moderate periventricular white matter consistent with chronic microvascular ischemic changes..  No parenchymal mass, hemorrhage, edema or major vascular distribution infarct.  Vascular calcifications are noted.    Mild prominence of the sulci and ventricles are consistent with age-related involutional changes.    No extra-axial blood or fluid collections.    Skull/extracranial contents (limited evaluation): No fracture. Paranasal sinuses are essentially clear.    Small amount of fluid is seen within the inferior right mastoid air cells without a discrete fracture identified                               X-Ray Foot Complete Right (Final result)  Result time 05/08/18 11:50:49    Final result by Kee Vazquez MD (05/08/18 11:50:49)                 Impression:      No acute fracture or dislocation.      Electronically signed by: Kee Vazquez MD  Date:    05/08/2018  Time:    11:50             Narrative:    EXAMINATION:  XR FOOT COMPLETE 3 VIEW RIGHT    CLINICAL HISTORY:  XR FOOT COMPLETE 3 VIEW RIGHTPain in right ankle and joints of right foot    FINDINGS:  Three views of the right foot were obtained.    No evidence of acute fracture or dislocation.  Bony mineralization is normal.  Diffuse soft tissue  swelling and mild degenerative changes.                               X-Ray Elbow Complete Right (Final result)  Result time 05/08/18 11:47:11    Final result by Kee Vazquez MD (05/08/18 11:47:11)                 Impression:      No acute fracture or dislocation.      Electronically signed by: Kee Vazquez MD  Date:    05/08/2018  Time:    11:47             Narrative:    EXAMINATION:  XR ELBOW COMPLETE 3 VIEW RIGHT    CLINICAL HISTORY:  XR ELBOW COMPLETE 3 VIEW RIGHTPain in right elbow    FINDINGS:  Two views of the left elbow were obtained.    No evidence of acute fracture or dislocation.  Bony mineralization is normal.  Soft tissues are unremarkable.   No joint effusion                               X-Ray Knee Complete 4 Or More Views Right (Final result)  Result time 05/08/18 11:47:46    Final result by Kee Vazquez MD (05/08/18 11:47:46)                 Impression:      No acute fracture or dislocation.      Electronically signed by: Kee Vazquez MD  Date:    05/08/2018  Time:    11:47             Narrative:    EXAMINATION:  XR KNEE COMP 4 OR MORE VIEWS RIGHT    CLINICAL HISTORY:  Pain in right knee    FINDINGS:  Results:  No evidence of acute fracture or dislocation.  Bony mineralization is normal.  Soft tissues are unremarkable. Lateral view of the right knee demonstrates trace joint effusion.    Mild tricompartment degenerative changes, greatest within the medial compartment.  Vascular calcifications are noted.                               X-Ray Ankle Complete Right (Final result)  Result time 05/08/18 11:51:11    Final result by Kee Vazquez MD (05/08/18 11:51:11)                 Impression:      No acute fracture or dislocation.      Electronically signed by: Kee Vazquez MD  Date:    05/08/2018  Time:    11:51             Narrative:    EXAMINATION:  XR ANKLE COMPLETE 3 VIEW RIGHT    CLINICAL HISTORY:  XR ANKLE COMPLETE 3 VIEW RIGHTPain in right ankle and joints of right  foot    FINDINGS:  Three views of the right ankle were obtained.    No evidence of acute fracture or dislocation.  Bony mineralization is normal.  Mild soft tissue swelling.                               X-Ray Chest AP Portable (Final result)  Result time 05/08/18 11:46:44    Final result by Kee Vazquez MD (05/08/18 11:46:44)                 Impression:      No acute process seen.      Electronically signed by: Kee Vazquez MD  Date:    05/08/2018  Time:    11:46             Narrative:    EXAMINATION:  XR CHEST AP PORTABLE    CLINICAL HISTORY:  hypotension;    FINDINGS:  Single view of the chest.    Cardiac silhouette is normal.  Aorta demonstrates atherosclerotic disease.  The lungs demonstrate no evidence of active disease.  No evidence of pleural effusion or pneumothorax.  Bones appear intact.                                 The EKG was ordered, reviewed, and independently interpreted by the ED provider.  Interpretation time: 1116  Rate: 102 BPM  Rhythm: sinus tachycardia  Interpretation: No STEMI.             The Emergency Provider reviewed the vital signs and test results, which are outlined above.    ED Discussion     2:23 PM: Reassessed pt at this time. Pt is awake, alert, and in no distress.  No hypotensive episodes while in ER, pt is orthostatic prior to administration of fluids.  This has resolved. Offered pt admission, which the pt declines at this time. Discussed with pt all pertinent ED information and results. Discussed pt dx and plan of tx. Gave pt all f/u and return to the ED instructions. All questions and concerns were addressed at this time. Pt expresses understanding of information and instructions, and is comfortable with plan to discharge. Pt is stable for discharge.    I discussed with patient and/or family/caretaker that evaluation in the ED does not suggest any emergent or life threatening medical conditions requiring immediate intervention beyond what was provided in the ED, and I  believe patient is safe for discharge.  Regardless, an unremarkable evaluation in the ED does not preclude the development or presence of a serious of life threatening condition. As such, patient was instructed to return immediately for any worsening or change in current symptoms.    Regarding DEHYDRATION, advised patient to call 911 if they should experience confusion, dizziness, lethargy, and/or lightheadedness.  The patient was instructed to contact their primary care provider immediately or return to the ED for any of the following symptoms: blood in the stool or vomit; diarrhea or vomiting for an extended period of time (vomiting > 24 hours or diarrhea for > 3 days); dry mouth or dry eyes; poor skin turgor; listlessness and inactiveness; tachycardia; little or no urine output for 8 hours; inability to keep fluids down; and sunken eyes.  The patient was encouraged to drink plenty of water daily and to increase water intake when weather is hot or during exercise.    Regarding CONTUSIONS, I advised patient to: REST the injured area or use it less than usual; apply ICE to decrease swelling and pain and help prevent tissue damage; use COMPRESSION with an elastic bandage to support the area and decrease swelling; ELEVATE injured body part above the level of the heart to help decrease pain and swelling; AVOID using massage or massage to acute injuries as it may slow healing of the area; AVOID drinking alcohol as it may slow healing of the injury; and avoid stretching injured muscles. Advised patient to return to the emergency department or contact primary care provider if: having trouble moving injured area; notice tingling or numbness in or near the injured area; extremity below the bruise gets cold or turns pale; a new lump develops in the injured area; symptoms do not improve with treatment after 4 to 5 days; there is any questions or concerns about the condition or treatment plan.     Regarding BRONCHITIS, for  treatment, I encouraged patient to refrain from smoking, drink plenty of fluids, rest, take medications as prescribed, and to use a humidifier or steam in the bathroom. Patient instructed to notify primary care provider if: they have a cough most days or have a cough that returns frequently; are coughing up blood; have a high fever or shaking chills; have a low-grade fever for three or more days; develop thick, greenish mucus, especially if it has a bad smell; and feel short of breath or have chest pain. For prevention, discussed with patient the importance of not smoking, getting annual influenza vaccines, reducing exposure to air pollution, and to frequently wash hands to avoid spread of infection.  Instructed patient to return to emergency department if they experience chest pain or shortness of breath and to follow up with primary care provider for re-evaluation.    ED Medication(s):  Medications   aspirin tablet 325 mg (325 mg Oral Given 5/8/18 1140)   sodium chloride 0.9% bolus 500 mL (0 mLs Intravenous Stopped 5/8/18 1256)   acetaminophen tablet 1,000 mg (1,000 mg Oral Given 5/8/18 1140)   albuterol-ipratropium 2.5mg-0.5mg/3mL nebulizer solution 3 mL (3 mLs Nebulization Given 5/8/18 1147)       Discharge Medication List as of 5/8/2018  2:26 PM      START taking these medications    Details   azithromycin (ZITHROMAX Z-MARISELA) 250 MG tablet Take z pack as directed, Print      methylPREDNISolone (MEDROL DOSEPACK) 4 mg tablet Take medrol dose pack as directed, Print             Follow-up Information     Jacob Ferreira MD. Schedule an appointment as soon as possible for a visit in 1 week.    Specialty:  Family Medicine  Contact information:  [address] [facility]   [address]  [facility] [id number] [phone]             [facility] - .    Specialty:  Emergency Medicine  Why:  As needed, If symptoms worsen  Contact information:  [address]  [facility] [address] [phone]            [facility] - Pulmonary Services. Schedule an appointment as soon as possible for a visit in 1 week.    Specialty:  Pulmonology  Contact information:  [address]  [facility] [address] [phone]  Additional information:  (off [facility]) 3th floor                   Medical Decision Making    Medical Decision Making:   Clinical Tests:   Lab Tests: Ordered and Reviewed  Radiological Study: Ordered and Reviewed  Medical Tests: Ordered and Reviewed           Scribe Attestation:   Scribe #1: I performed the above scribed service and the documentation accurately describes the services I performed. I attest to the accuracy of the note.    Attending:   Physician Attestation Statement for Scribe #1: I, Thiago Bee Jr., MD, personally performed the services described in this documentation, as scribed by Corinne Mack, in my presence, and it is both accurate and complete.          Clinical Impression       ICD-10-CM ICD-9-CM   1. Contusion of right elbow, initial encounter S50.01XA 923.11   2. Hypotension I95.9 458.9   3. Right elbow pain M25.521 719.42   4. Right knee pain M25.561 719.46   5. Right ankle pain M25.571 719.47   6. Fall W19.XXXA E888.9   7. Strain of right ankle, initial encounter S96.911A 845.00   8. Contusion of right knee, initial encounter S80.01XA 924.11   9. Bronchitis J40 490   10. Dehydration E86.0 276.51       Disposition:   Disposition: Discharged  Condition: Stable           Thiago Bee Jr., MD  05/08/18 9381

## 2018-05-08 NOTE — DISCHARGE INSTRUCTIONS
Regarding DEHYDRATION, advised patient to call 911 if they should experience confusion, dizziness, lethargy, and/or lightheadedness.  The patient was instructed to contact their primary care provider immediately or return to the ED for any of the following symptoms: blood in the stool or vomit; diarrhea or vomiting for an extended period of time (vomiting > 24 hours or diarrhea for > 3 days); dry mouth or dry eyes; poor skin turgor; listlessness and inactiveness; tachycardia; little or no urine output for 8 hours; inability to keep fluids down; and sunken eyes.  The patient was encouraged to drink plenty of water daily and to increase water intake when weather is hot or during exercise.    Regarding BRONCHITIS, for treatment, I encouraged patient to refrain from smoking, drink plenty of fluids, rest, take medications as prescribed, and to use a humidifier or steam in the bathroom. Patient instructed to notify primary care provider if: they have a cough most days or have a cough that returns frequently; are coughing up blood; have a high fever or shaking chills; have a low-grade fever for three or more days; develop thick, greenish mucus, especially if it has a bad smell; and feel short of breath or have chest pain. For prevention, discussed with patient the importance of not smoking, getting annual influenza vaccines, reducing exposure to air pollution, and to frequently wash hands to avoid spread of infection.  Instructed patient to return to emergency department if they experience chest pain or shortness of breath and to follow up with primary care provider for re-evaluation.    Regarding CONTUSIONS, I advised patient to: REST the injured area or use it less than usual; apply ICE to decrease swelling and pain and help prevent tissue damage; use COMPRESSION with an elastic bandage to support the area and decrease swelling; ELEVATE injured body part above the level of the heart to help decrease pain and swelling;  AVOID using massage or massage to acute injuries as it may slow healing of the area; AVOID drinking alcohol as it may slow healing of the injury; and avoid stretching injured muscles. Advised patient to return to the emergency department or contact primary care provider if: having trouble moving injured area; notice tingling or numbness in or near the injured area; extremity below the bruise gets cold or turns pale; a new lump develops in the injured area; symptoms do not improve with treatment after 4 to 5 days; there is any questions or concerns about the condition or treatment plan.

## 2018-05-14 ENCOUNTER — TELEPHONE (OUTPATIENT)
Dept: PULMONOLOGY | Facility: CLINIC | Age: 63
End: 2018-05-14

## 2018-05-14 ENCOUNTER — OFFICE VISIT (OUTPATIENT)
Dept: PULMONOLOGY | Facility: CLINIC | Age: 63
End: 2018-05-14
Payer: MEDICAID

## 2018-05-14 VITALS
BODY MASS INDEX: 27.87 KG/M2 | OXYGEN SATURATION: 98 % | WEIGHT: 199.06 LBS | RESPIRATION RATE: 20 BRPM | HEIGHT: 71 IN | SYSTOLIC BLOOD PRESSURE: 128 MMHG | HEART RATE: 110 BPM | DIASTOLIC BLOOD PRESSURE: 64 MMHG

## 2018-05-14 DIAGNOSIS — Z12.9 SCREENING FOR CANCER: ICD-10-CM

## 2018-05-14 DIAGNOSIS — J44.9 CHRONIC OBSTRUCTIVE PULMONARY DISEASE, UNSPECIFIED COPD TYPE: Primary | ICD-10-CM

## 2018-05-14 DIAGNOSIS — F17.210 TOBACCO DEPENDENCE DUE TO CIGARETTES: Chronic | ICD-10-CM

## 2018-05-14 PROCEDURE — 99214 OFFICE O/P EST MOD 30 MIN: CPT | Mod: PBBFAC | Performed by: NURSE PRACTITIONER

## 2018-05-14 PROCEDURE — 99204 OFFICE O/P NEW MOD 45 MIN: CPT | Mod: S$PBB,,, | Performed by: NURSE PRACTITIONER

## 2018-05-14 PROCEDURE — 99999 PR PBB SHADOW E&M-EST. PATIENT-LVL IV: CPT | Mod: PBBFAC,,, | Performed by: NURSE PRACTITIONER

## 2018-05-14 RX ORDER — PREDNISONE 20 MG/1
TABLET ORAL
Qty: 20 TABLET | Refills: 0 | Status: SHIPPED | OUTPATIENT
Start: 2018-05-14 | End: 2018-05-14 | Stop reason: SDUPTHER

## 2018-05-14 RX ORDER — ROFLUMILAST 500 UG/1
500 TABLET ORAL DAILY
Qty: 30 TABLET | Refills: 11 | Status: SHIPPED | OUTPATIENT
Start: 2018-05-14 | End: 2019-02-07

## 2018-05-14 RX ORDER — ALBUTEROL SULFATE 0.83 MG/ML
2.5 SOLUTION RESPIRATORY (INHALATION) EVERY 8 HOURS PRN
COMMUNITY
End: 2019-02-07

## 2018-05-14 RX ORDER — ALBUTEROL SULFATE 90 UG/1
2 AEROSOL, METERED RESPIRATORY (INHALATION) EVERY 4 HOURS PRN
COMMUNITY
End: 2018-05-14

## 2018-05-14 RX ORDER — PREDNISONE 20 MG/1
TABLET ORAL
Qty: 20 TABLET | Refills: 0 | Status: ON HOLD | OUTPATIENT
Start: 2018-05-14 | End: 2018-09-02 | Stop reason: SDUPTHER

## 2018-05-14 NOTE — PROGRESS NOTES
"Consult    Subjective:      Patient ID: Filemon Aranda is a 62 y.o. male.    Patient Active Problem List   Diagnosis    COPD exacerbation    Tobacco dependence due to cigarettes    Bronchitis    Essential hypertension    Hyperlipidemia    Contusion of right knee    Strain of right ankle    Contusion of right elbow    Fall    Right ankle pain    Right knee pain    Right elbow pain    Hypotension    Dehydration     Problem list has been reviewed.    he has been referred by Self, [facility] for evaluation and management for   Chief Complaint   Patient presents with    COPD    Hospital Follow Up     Chief Complaint: COPD and Hospital Follow Up    HPI:  Filemon Aranda is a 62 y.o. male presents to pulmonary clinic for ER follow up from 5/8/2018 when patient with known COPD was diagnosed bronchitis and discharged on zithromax and prednisone.  He is followed by his Primary Care Provider's office and Dr. Pickett with [facility] in past, Last seen by Primary Care Provider's last week. Last seen by pulmonologist, Dr. Pickett October 2017.   Patient canceled follow up with Dr. Pickett, since wanted to only follow up with his Primary Care Provider for COPD.   Last pft with [facility] 10/2017   Diagnosis with emphysema in about 2011.   His current inhaler medication: Albuterol nebulizer. Anoro controller. Has tried dulera, advair, symbicort, in past and did not tolerate ICS with candida over growth. He has been on incurse, spiriva in past.  Anoro began 7 days ago, prescribed by Primary Care Provider's office. Which has improved his shortness of breath with exertion.   Symptoms include productive cough of whitish bubbles, cough and wheezing daily, long standing. No purulent mucous. Shortness of breath with prolonged exertion.   Current every day smoker 1/2 pack or less/day since Jan 2018. Prior 2 pk/day non filtered x 49 years. 98 pack year cigarette smoker.   Patient had ER visit related to "passing out" a lot per " "patient and wife. He reports he has "passed out with hard coughing in past", not daily.   He completed Zithromax and prednisone, reports improved.   Exercise: no regular program. No work around the home, patient reports they call him "Mr. Newberry" around his home.     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 No occupational exposure to Asbestos, Silica,  Petrochemicals,  Fiber glass,  Grain Dust and  Pesticides. Occupational to saw dust and paint.     Avocational Exposure:   None currently.     Pet Exposures:  None currently. Denies allergy to Dog and  cat dander.    Previous Report Reviewed: ER records, lab reports, office notes and radiology reports     Past Medical History: The following portions of the patient's history were reviewed and updated as appropriate:   He  has a past surgical history that includes Kidney stone surgery.  His family history includes Dementia in his father; Diabetes in his brother; Heart disease in his brother, brother, and father; Hypertension in his brother and father; Stroke in his mother.  He  reports that he has been smoking Cigarettes.  He started smoking about 49 years ago. He has a 98.00 pack-year smoking history. He has never used smokeless tobacco. He reports that he drinks about 3.0 oz of alcohol per week . He reports that he does not use drugs.  He has a current medication list which includes the following prescription(s): albuterol, b complex vitamins, carvedilol, lisinopril-hydrochlorothiazide, lovastatin, melatonin, pantoprazole, trazodone/dietary supp. no.8, umeclidinium-vilanterol, ipratropium-albuterol, prednisone, and roflumilast.  He has No Known Allergies.    Review of Systems   Constitutional: Negative for fever, chills, weight loss, weight gain, activity change, appetite change, fatigue and night sweats.   HENT: Negative for postnasal drip, rhinorrhea, sinus pressure, voice change and congestion.    Eyes: " Negative for redness and itching.   Respiratory: Negative for snoring, cough, sputum production, chest tightness, shortness of breath, wheezing, orthopnea, asthma nighttime symptoms, dyspnea on extertion, use of rescue inhaler and somnolence.    Cardiovascular: Negative.  Negative for chest pain, palpitations and leg swelling.   Genitourinary: Negative for difficulty urinating and hematuria.   Endocrine: Negative for cold intolerance and heat intolerance.    Musculoskeletal: Negative for arthralgias, gait problem, joint swelling and myalgias.   Skin: Negative.    Gastrointestinal: Negative for nausea, vomiting, abdominal pain and acid reflux.   Neurological: Negative for dizziness, weakness, light-headedness and headaches.   Hematological: Negative for adenopathy. No excessive bruising.   All other systems reviewed and are negative.     Objective:     Physical Exam   Constitutional: He is oriented to person, place, and time. He appears well-developed and well-nourished. He is active and cooperative.  Non-toxic appearance. He does not appear ill. No distress.   HENT:   Head: Normocephalic and atraumatic.   Right Ear: External ear normal.   Left Ear: External ear normal.   Nose: Nose normal.   Mouth/Throat: Oropharynx is clear and moist. No oropharyngeal exudate.   Eyes: Conjunctivae are normal.   Neck: Normal range of motion. Neck supple.   Cardiovascular: Normal rate, regular rhythm, normal heart sounds and intact distal pulses.    Pulmonary/Chest: Effort normal. He has wheezes (diffuse expiratory, pt refused in clinic neb, wants to go home to take use his nebulizer.).   Abdominal: Soft.   Musculoskeletal: He exhibits no edema.   Neurological: He is alert and oriented to person, place, and time.   Skin: Skin is warm and dry.   Psychiatric: He has a normal mood and affect. His behavior is normal. Judgment and thought content normal.   Vitals reviewed.    Vitals:    05/14/18 1219   BP: 128/64   Pulse: 110   Resp: 20  "  SpO2: 98%   Weight: 90.3 kg (199 lb 1.2 oz)   Height: 5' 11" (1.803 m)     Estimated body mass index is 27.77 kg/m² as calculated from the following:    Height as of this encounter: 5' 11" (1.803 m).    Weight as of this encounter: 90.3 kg (199 lb 1.2 oz).    Personal Diagnostic Review  Chest xray 5/8/2018  EXAMINATION:  XR CHEST AP PORTABLE  CLINICAL HISTORY:  hypotension;  FINDINGS:  Single view of the chest.  Cardiac silhouette is normal.  Aorta demonstrates atherosclerotic disease.    The lungs demonstrate no evidence of active disease.  No evidence of pleural effusion or pneumothorax.  Bones appear intact.    Assessment:     1. Chronic obstructive pulmonary disease, unspecified COPD type    2. Tobacco dependence due to cigarettes    3. Screening for cancer      Orders Placed This Encounter   Procedures    CT Chest Lung Screening Low Dose     Standing Status:   Future     Standing Expiration Date:   5/14/2019     Order Specific Question:   Are you a current or former smoker who quit within the past 15 years?     Answer:   Yes     Order Specific Question:   Are you between age 55-77 years old?     Answer:   Yes     Order Specific Question:   Has the patient ever had lung cancer or an abnormal Chest CT?     Answer:   No     Order Specific Question:   Has the patient smoked 30 or more packs of cigarettes/year?     Answer:   Yes     Order Specific Question:   May the Radiologist modify the order per protocol to meet the clinical needs of the patient?     Answer:   Yes    Ambulatory Referral to Pulmonary Disease Management     Referral Priority:   Routine     Referral Type:   Consultation     Referral Reason:   Specialty Services Required     Requested Specialty:   Pulmonary Disease     Number of Visits Requested:   1    Complete PFT with bronchodilator     Standing Status:   Future     Standing Expiration Date:   5/14/2019    PULM - Arterial Blood Gases--in addition to PFT only     Standing Status:   Future    "  Standing Expiration Date:   5/14/2019    Stress test, pulmonary     Standing Status:   Future     Standing Expiration Date:   5/14/2019     Plan:     Discussed diagnosis, its evaluation, treatment and usual course. All questions answered.    Problem List Items Addressed This Visit     Tobacco dependence due to cigarettes (Chronic)     Weaned from 2pk x 49 years to 1/2 pk since Jan 2018  Not motivated to quit.   Declined smoking cessation, card with contact info provided.  Strong recommendation for complete smoking cessation with review of quit tips and written information per after visit summary.  The patient states knowledge of importance of quitting and multiple health risk to continued smoking.  Stated could wait to leave clinic to be able to smoke his cigarettes.              Relevant Orders    CT Chest Lung Screening Low Dose      Other Visit Diagnoses     Chronic obstructive pulmonary disease, unspecified COPD type    -  Primary    Relevant Medications    roflumilast 500 mcg Tab    ipratropium-albuterol (COMBIVENT)  mcg/actuation inhaler    predniSONE (DELTASONE) 20 MG tablet    Other Relevant Orders    Complete PFT with bronchodilator    PULM - Arterial Blood Gases--in addition to PFT only    Stress test, pulmonary    Ambulatory Referral to Pulmonary Disease Management    Screening for cancer        Relevant Orders    CT Chest Lung Screening Low Dose        Patient is symptomatic with diffuse fine expiratory wheezing on ascultation, refused in clinic neb treatment. Wants to go home to take his own albuterol nebs, reports he always has a wheeze.   Total time spent in face to face counseling and coordination of care 40 in face to face  discussion concerning diagnosis, prognosis, review of lab and test results, benefits of treatment as well as management of disease, counseling of patient and coordination of care between various health  care providers . Greater than half the time spent was used for  coordination of care and counseling of patient. Discussion with other physicians or health care providers occurred.    Follow-up in about 3 months (around 8/14/2018) for COPD w/review cpf/pul stress/abg.

## 2018-05-14 NOTE — ASSESSMENT & PLAN NOTE
Weaned from 2pk x 49 years to 1/2 pk since Jan 2018  Not motivated to quit.   Declined smoking cessation, card with contact info provided.  Strong recommendation for complete smoking cessation with review of quit tips and written information per after visit summary.  The patient states knowledge of importance of quitting and multiple health risk to continued smoking.  Stated could wait to leave clinic to be able to smoke his cigarettes.

## 2018-05-14 NOTE — TELEPHONE ENCOUNTER
Attempted to contact patient and notify patient that Rx For Prednisone escripted to [facility], no answer, LVM

## 2018-05-16 ENCOUNTER — TELEPHONE (OUTPATIENT)
Dept: PULMONOLOGY | Facility: CLINIC | Age: 63
End: 2018-05-16

## 2018-08-31 ENCOUNTER — HOSPITAL ENCOUNTER (OUTPATIENT)
Facility: HOSPITAL | Age: 63
Discharge: HOME | DRG: 871 | End: 2018-09-02
Attending: EMERGENCY MEDICINE | Admitting: INTERNAL MEDICINE
Payer: MEDICAID

## 2018-08-31 DIAGNOSIS — A41.9 SEPSIS, DUE TO UNSPECIFIED ORGANISM: ICD-10-CM

## 2018-08-31 DIAGNOSIS — R05.9 COUGH: ICD-10-CM

## 2018-08-31 DIAGNOSIS — J44.9 CHRONIC OBSTRUCTIVE PULMONARY DISEASE, UNSPECIFIED COPD TYPE: ICD-10-CM

## 2018-08-31 DIAGNOSIS — R06.02 SHORTNESS OF BREATH: ICD-10-CM

## 2018-08-31 DIAGNOSIS — J44.1 COPD EXACERBATION: Primary | ICD-10-CM

## 2018-08-31 DIAGNOSIS — J18.9 PNEUMONIA DUE TO INFECTIOUS ORGANISM, UNSPECIFIED LATERALITY, UNSPECIFIED PART OF LUNG: ICD-10-CM

## 2018-08-31 LAB
ALBUMIN SERPL BCP-MCNC: 3.1 G/DL
ALP SERPL-CCNC: 106 U/L
ALT SERPL W/O P-5'-P-CCNC: 37 U/L
ANION GAP SERPL CALC-SCNC: 11 MMOL/L
ANISOCYTOSIS BLD QL SMEAR: ABNORMAL
AST SERPL-CCNC: 61 U/L
BASOPHILS # BLD AUTO: 0.42 K/UL
BASOPHILS NFR BLD: 2.9 %
BILIRUB SERPL-MCNC: 0.4 MG/DL
BILIRUB UR QL STRIP: NEGATIVE
BUN SERPL-MCNC: 26 MG/DL
CALCIUM SERPL-MCNC: 9 MG/DL
CHLORIDE SERPL-SCNC: 97 MMOL/L
CLARITY UR: CLEAR
CO2 SERPL-SCNC: 23 MMOL/L
COLOR UR: YELLOW
CREAT SERPL-MCNC: 1 MG/DL
DACRYOCYTES BLD QL SMEAR: ABNORMAL
DIFFERENTIAL METHOD: ABNORMAL
EOSINOPHIL # BLD AUTO: 0.1 K/UL
EOSINOPHIL NFR BLD: 0.3 %
ERYTHROCYTE [DISTWIDTH] IN BLOOD BY AUTOMATED COUNT: 22.5 %
EST. GFR  (AFRICAN AMERICAN): >60 ML/MIN/1.73 M^2
EST. GFR  (NON AFRICAN AMERICAN): >60 ML/MIN/1.73 M^2
GLUCOSE SERPL-MCNC: 112 MG/DL
GLUCOSE UR QL STRIP: NEGATIVE
HCT VFR BLD AUTO: 36.8 %
HGB BLD-MCNC: 10.9 G/DL
HGB UR QL STRIP: NEGATIVE
HYPOCHROMIA BLD QL SMEAR: ABNORMAL
KETONES UR QL STRIP: NEGATIVE
LACTATE SERPL-SCNC: 1 MMOL/L
LEUKOCYTE ESTERASE UR QL STRIP: NEGATIVE
LYMPHOCYTES # BLD AUTO: 7.9 K/UL
LYMPHOCYTES NFR BLD: 55.2 %
MCH RBC QN AUTO: 25.4 PG
MCHC RBC AUTO-ENTMCNC: 29.6 G/DL
MCV RBC AUTO: 86 FL
MONOCYTES # BLD AUTO: 1 K/UL
MONOCYTES NFR BLD: 7 %
NEUTROPHILS # BLD AUTO: 5 K/UL
NEUTROPHILS NFR BLD: 35.5 %
NITRITE UR QL STRIP: NEGATIVE
OVALOCYTES BLD QL SMEAR: ABNORMAL
PH UR STRIP: 6 [PH] (ref 5–8)
PLATELET # BLD AUTO: 189 K/UL
PLATELET BLD QL SMEAR: ABNORMAL
PMV BLD AUTO: 10.6 FL
POIKILOCYTOSIS BLD QL SMEAR: SLIGHT
POLYCHROMASIA BLD QL SMEAR: ABNORMAL
POTASSIUM SERPL-SCNC: 4.7 MMOL/L
PROT SERPL-MCNC: 7.6 G/DL
PROT UR QL STRIP: NEGATIVE
RBC # BLD AUTO: 4.29 M/UL
SODIUM SERPL-SCNC: 131 MMOL/L
SP GR UR STRIP: 1.01 (ref 1–1.03)
SPHEROCYTES BLD QL SMEAR: ABNORMAL
STOMATOCYTES BLD QL SMEAR: PRESENT
TARGETS BLD QL SMEAR: ABNORMAL
TROPONIN I SERPL DL<=0.01 NG/ML-MCNC: 0.01 NG/ML
URN SPEC COLLECT METH UR: NORMAL
UROBILINOGEN UR STRIP-ACNC: NEGATIVE EU/DL
WBC # BLD AUTO: 14.38 K/UL

## 2018-08-31 PROCEDURE — 25000242 PHARM REV CODE 250 ALT 637 W/ HCPCS: Performed by: EMERGENCY MEDICINE

## 2018-08-31 PROCEDURE — 94640 AIRWAY INHALATION TREATMENT: CPT

## 2018-08-31 PROCEDURE — 87040 BLOOD CULTURE FOR BACTERIA: CPT

## 2018-08-31 PROCEDURE — 93005 ELECTROCARDIOGRAM TRACING: CPT

## 2018-08-31 PROCEDURE — 25000003 PHARM REV CODE 250: Performed by: EMERGENCY MEDICINE

## 2018-08-31 PROCEDURE — G0378 HOSPITAL OBSERVATION PER HR: HCPCS

## 2018-08-31 PROCEDURE — 36415 COLL VENOUS BLD VENIPUNCTURE: CPT

## 2018-08-31 PROCEDURE — 25500020 PHARM REV CODE 255: Performed by: INTERNAL MEDICINE

## 2018-08-31 PROCEDURE — 93010 ELECTROCARDIOGRAM REPORT: CPT | Mod: ,,, | Performed by: INTERNAL MEDICINE

## 2018-08-31 PROCEDURE — 96375 TX/PRO/DX INJ NEW DRUG ADDON: CPT | Mod: 59

## 2018-08-31 PROCEDURE — 81003 URINALYSIS AUTO W/O SCOPE: CPT

## 2018-08-31 PROCEDURE — 96365 THER/PROPH/DIAG IV INF INIT: CPT | Mod: 59

## 2018-08-31 PROCEDURE — 21400001 HC TELEMETRY ROOM

## 2018-08-31 PROCEDURE — 83605 ASSAY OF LACTIC ACID: CPT

## 2018-08-31 PROCEDURE — 84484 ASSAY OF TROPONIN QUANT: CPT

## 2018-08-31 PROCEDURE — 63600175 PHARM REV CODE 636 W HCPCS: Performed by: EMERGENCY MEDICINE

## 2018-08-31 PROCEDURE — 27000221 HC OXYGEN, UP TO 24 HOURS

## 2018-08-31 PROCEDURE — 85025 COMPLETE CBC W/AUTO DIFF WBC: CPT

## 2018-08-31 PROCEDURE — 99291 CRITICAL CARE FIRST HOUR: CPT | Mod: 25

## 2018-08-31 PROCEDURE — 80053 COMPREHEN METABOLIC PANEL: CPT

## 2018-08-31 RX ORDER — LABETALOL HYDROCHLORIDE 5 MG/ML
10 INJECTION, SOLUTION INTRAVENOUS EVERY 6 HOURS PRN
Status: DISCONTINUED | OUTPATIENT
Start: 2018-09-01 | End: 2018-09-02 | Stop reason: HOSPADM

## 2018-08-31 RX ORDER — LISINOPRIL AND HYDROCHLOROTHIAZIDE 20; 25 MG/1; MG/1
1 TABLET ORAL DAILY
Status: DISCONTINUED | OUTPATIENT
Start: 2018-09-01 | End: 2018-08-31

## 2018-08-31 RX ORDER — PANTOPRAZOLE SODIUM 40 MG/1
40 TABLET, DELAYED RELEASE ORAL DAILY
Status: DISCONTINUED | OUTPATIENT
Start: 2018-09-01 | End: 2018-09-02 | Stop reason: HOSPADM

## 2018-08-31 RX ORDER — GUAIFENESIN 100 MG/5ML
200 SOLUTION ORAL EVERY 4 HOURS PRN
Status: DISCONTINUED | OUTPATIENT
Start: 2018-09-01 | End: 2018-09-02 | Stop reason: HOSPADM

## 2018-08-31 RX ORDER — CLONIDINE HYDROCHLORIDE 0.1 MG/1
0.1 TABLET ORAL EVERY 4 HOURS PRN
Status: DISCONTINUED | OUTPATIENT
Start: 2018-09-01 | End: 2018-09-02 | Stop reason: HOSPADM

## 2018-08-31 RX ORDER — METHYLPREDNISOLONE SOD SUCC 125 MG
80 VIAL (EA) INJECTION EVERY 8 HOURS
Status: DISCONTINUED | OUTPATIENT
Start: 2018-09-01 | End: 2018-09-02 | Stop reason: HOSPADM

## 2018-08-31 RX ORDER — CLOPIDOGREL BISULFATE 75 MG/1
75 TABLET ORAL DAILY
COMMUNITY

## 2018-08-31 RX ORDER — PREDNISONE 20 MG/1
60 TABLET ORAL
Status: COMPLETED | OUTPATIENT
Start: 2018-08-31 | End: 2018-08-31

## 2018-08-31 RX ORDER — DIPHENHYDRAMINE HCL 25 MG
25 CAPSULE ORAL EVERY 6 HOURS PRN
Status: DISCONTINUED | OUTPATIENT
Start: 2018-09-01 | End: 2018-09-02 | Stop reason: HOSPADM

## 2018-08-31 RX ORDER — ACETAMINOPHEN 325 MG/1
650 TABLET ORAL EVERY 6 HOURS PRN
Status: DISCONTINUED | OUTPATIENT
Start: 2018-09-01 | End: 2018-09-01

## 2018-08-31 RX ORDER — IPRATROPIUM BROMIDE AND ALBUTEROL SULFATE 2.5; .5 MG/3ML; MG/3ML
3 SOLUTION RESPIRATORY (INHALATION) EVERY 6 HOURS
Status: DISCONTINUED | OUTPATIENT
Start: 2018-09-01 | End: 2018-09-02 | Stop reason: HOSPADM

## 2018-08-31 RX ORDER — ONDANSETRON 2 MG/ML
4 INJECTION INTRAMUSCULAR; INTRAVENOUS
Status: COMPLETED | OUTPATIENT
Start: 2018-08-31 | End: 2018-08-31

## 2018-08-31 RX ORDER — VANCOMYCIN HCL IN 5 % DEXTROSE 1G/250ML
1000 PLASTIC BAG, INJECTION (ML) INTRAVENOUS
Status: COMPLETED | OUTPATIENT
Start: 2018-08-31 | End: 2018-08-31

## 2018-08-31 RX ORDER — CLOPIDOGREL BISULFATE 75 MG/1
75 TABLET ORAL DAILY
Status: DISCONTINUED | OUTPATIENT
Start: 2018-09-01 | End: 2018-09-02 | Stop reason: HOSPADM

## 2018-08-31 RX ORDER — SODIUM CHLORIDE 9 MG/ML
INJECTION, SOLUTION INTRAVENOUS CONTINUOUS
Status: ACTIVE | OUTPATIENT
Start: 2018-09-01 | End: 2018-09-02

## 2018-08-31 RX ORDER — HYDROCHLOROTHIAZIDE 25 MG/1
25 TABLET ORAL DAILY
Status: DISCONTINUED | OUTPATIENT
Start: 2018-09-01 | End: 2018-08-31

## 2018-08-31 RX ORDER — IPRATROPIUM BROMIDE AND ALBUTEROL SULFATE 2.5; .5 MG/3ML; MG/3ML
3 SOLUTION RESPIRATORY (INHALATION)
Status: COMPLETED | OUTPATIENT
Start: 2018-08-31 | End: 2018-08-31

## 2018-08-31 RX ORDER — ONDANSETRON 2 MG/ML
4 INJECTION INTRAMUSCULAR; INTRAVENOUS EVERY 8 HOURS PRN
Status: DISCONTINUED | OUTPATIENT
Start: 2018-09-01 | End: 2018-09-02 | Stop reason: HOSPADM

## 2018-08-31 RX ORDER — IBUPROFEN 200 MG
1 TABLET ORAL DAILY
Status: DISCONTINUED | OUTPATIENT
Start: 2018-09-01 | End: 2018-09-02 | Stop reason: HOSPADM

## 2018-08-31 RX ORDER — CARVEDILOL 12.5 MG/1
25 TABLET ORAL 2 TIMES DAILY WITH MEALS
Status: DISCONTINUED | OUTPATIENT
Start: 2018-09-01 | End: 2018-09-02 | Stop reason: HOSPADM

## 2018-08-31 RX ORDER — LISINOPRIL 20 MG/1
20 TABLET ORAL DAILY
Status: DISCONTINUED | OUTPATIENT
Start: 2018-09-01 | End: 2018-08-31

## 2018-08-31 RX ORDER — ENOXAPARIN SODIUM 100 MG/ML
40 INJECTION SUBCUTANEOUS EVERY 24 HOURS
Status: DISCONTINUED | OUTPATIENT
Start: 2018-08-31 | End: 2018-09-02 | Stop reason: HOSPADM

## 2018-08-31 RX ORDER — CELECOXIB 100 MG/1
100 CAPSULE ORAL 2 TIMES DAILY
Status: DISCONTINUED | OUTPATIENT
Start: 2018-08-31 | End: 2018-09-02 | Stop reason: HOSPADM

## 2018-08-31 RX ORDER — LOVASTATIN 20 MG/1
40 TABLET ORAL NIGHTLY
Status: DISCONTINUED | OUTPATIENT
Start: 2018-09-01 | End: 2018-09-02 | Stop reason: HOSPADM

## 2018-08-31 RX ORDER — MAG HYDROX/ALUMINUM HYD/SIMETH 200-200-20
30 SUSPENSION, ORAL (FINAL DOSE FORM) ORAL EVERY 6 HOURS PRN
Status: DISCONTINUED | OUTPATIENT
Start: 2018-09-01 | End: 2018-09-02 | Stop reason: HOSPADM

## 2018-08-31 RX ORDER — ZOLPIDEM TARTRATE 5 MG/1
5 TABLET ORAL NIGHTLY PRN
Status: DISCONTINUED | OUTPATIENT
Start: 2018-09-01 | End: 2018-09-02 | Stop reason: HOSPADM

## 2018-08-31 RX ADMIN — IPRATROPIUM BROMIDE AND ALBUTEROL SULFATE 3 ML: .5; 3 SOLUTION RESPIRATORY (INHALATION) at 08:08

## 2018-08-31 RX ADMIN — PIPERACILLIN AND TAZOBACTAM 4.5 G: 4; .5 INJECTION, POWDER, LYOPHILIZED, FOR SOLUTION INTRAVENOUS; PARENTERAL at 08:08

## 2018-08-31 RX ADMIN — PREDNISONE 60 MG: 20 TABLET ORAL at 08:08

## 2018-08-31 RX ADMIN — SODIUM CHLORIDE 2673 ML: 0.9 INJECTION, SOLUTION INTRAVENOUS at 08:08

## 2018-08-31 RX ADMIN — VANCOMYCIN HYDROCHLORIDE 1000 MG: 1 INJECTION, POWDER, LYOPHILIZED, FOR SOLUTION INTRAVENOUS at 09:08

## 2018-08-31 RX ADMIN — IPRATROPIUM BROMIDE AND ALBUTEROL SULFATE 3 ML: .5; 3 SOLUTION RESPIRATORY (INHALATION) at 10:08

## 2018-08-31 RX ADMIN — IOHEXOL 75 ML: 350 INJECTION, SOLUTION INTRAVENOUS at 11:08

## 2018-08-31 RX ADMIN — ONDANSETRON HYDROCHLORIDE 4 MG: 2 INJECTION, SOLUTION INTRAMUSCULAR; INTRAVENOUS at 08:08

## 2018-09-01 LAB
ALBUMIN SERPL BCP-MCNC: 2.7 G/DL
ALP SERPL-CCNC: 84 U/L
ALT SERPL W/O P-5'-P-CCNC: 30 U/L
ANION GAP SERPL CALC-SCNC: 7 MMOL/L
ANISOCYTOSIS BLD QL SMEAR: ABNORMAL
AST SERPL-CCNC: 44 U/L
BASOPHILS # BLD AUTO: 0.13 K/UL
BASOPHILS NFR BLD: 1.7 %
BILIRUB SERPL-MCNC: 0.3 MG/DL
BUN SERPL-MCNC: 18 MG/DL
CALCIUM SERPL-MCNC: 8.3 MG/DL
CHLORIDE SERPL-SCNC: 104 MMOL/L
CO2 SERPL-SCNC: 23 MMOL/L
CREAT SERPL-MCNC: 0.8 MG/DL
DACRYOCYTES BLD QL SMEAR: ABNORMAL
DIFFERENTIAL METHOD: ABNORMAL
EOSINOPHIL # BLD AUTO: 0 K/UL
EOSINOPHIL NFR BLD: 0 %
ERYTHROCYTE [DISTWIDTH] IN BLOOD BY AUTOMATED COUNT: 22.8 %
EST. GFR  (AFRICAN AMERICAN): >60 ML/MIN/1.73 M^2
EST. GFR  (NON AFRICAN AMERICAN): >60 ML/MIN/1.73 M^2
GLUCOSE SERPL-MCNC: 147 MG/DL
HCT VFR BLD AUTO: 32.2 %
HGB BLD-MCNC: 9.2 G/DL
HYPOCHROMIA BLD QL SMEAR: ABNORMAL
LACTATE SERPL-SCNC: 1.1 MMOL/L
LYMPHOCYTES # BLD AUTO: 4.2 K/UL
LYMPHOCYTES NFR BLD: 54.2 %
MAGNESIUM SERPL-MCNC: 1.9 MG/DL
MCH RBC QN AUTO: 24.6 PG
MCHC RBC AUTO-ENTMCNC: 28.6 G/DL
MCV RBC AUTO: 86 FL
MONOCYTES # BLD AUTO: 0.6 K/UL
MONOCYTES NFR BLD: 7.7 %
NEUTROPHILS # BLD AUTO: 2.8 K/UL
NEUTROPHILS NFR BLD: 37 %
OVALOCYTES BLD QL SMEAR: ABNORMAL
PHOSPHATE SERPL-MCNC: 3.1 MG/DL
PLATELET # BLD AUTO: 165 K/UL
PLATELET BLD QL SMEAR: ABNORMAL
PMV BLD AUTO: 9.7 FL
POIKILOCYTOSIS BLD QL SMEAR: SLIGHT
POLYCHROMASIA BLD QL SMEAR: ABNORMAL
POTASSIUM SERPL-SCNC: 4.2 MMOL/L
PROT SERPL-MCNC: 6.4 G/DL
RBC # BLD AUTO: 3.74 M/UL
SODIUM SERPL-SCNC: 134 MMOL/L
SPHEROCYTES BLD QL SMEAR: ABNORMAL
STOMATOCYTES BLD QL SMEAR: PRESENT
TARGETS BLD QL SMEAR: ABNORMAL
WBC # BLD AUTO: 7.71 K/UL

## 2018-09-01 PROCEDURE — 25000003 PHARM REV CODE 250: Performed by: NURSE PRACTITIONER

## 2018-09-01 PROCEDURE — 94760 N-INVAS EAR/PLS OXIMETRY 1: CPT

## 2018-09-01 PROCEDURE — 94640 AIRWAY INHALATION TREATMENT: CPT

## 2018-09-01 PROCEDURE — 96375 TX/PRO/DX INJ NEW DRUG ADDON: CPT

## 2018-09-01 PROCEDURE — 25000003 PHARM REV CODE 250: Performed by: INTERNAL MEDICINE

## 2018-09-01 PROCEDURE — 25000242 PHARM REV CODE 250 ALT 637 W/ HCPCS: Performed by: NURSE PRACTITIONER

## 2018-09-01 PROCEDURE — 96372 THER/PROPH/DIAG INJ SC/IM: CPT

## 2018-09-01 PROCEDURE — 96376 TX/PRO/DX INJ SAME DRUG ADON: CPT

## 2018-09-01 PROCEDURE — 94761 N-INVAS EAR/PLS OXIMETRY MLT: CPT

## 2018-09-01 PROCEDURE — 85025 COMPLETE CBC W/AUTO DIFF WBC: CPT

## 2018-09-01 PROCEDURE — 36415 COLL VENOUS BLD VENIPUNCTURE: CPT

## 2018-09-01 PROCEDURE — S4991 NICOTINE PATCH NONLEGEND: HCPCS | Performed by: INTERNAL MEDICINE

## 2018-09-01 PROCEDURE — 27000221 HC OXYGEN, UP TO 24 HOURS

## 2018-09-01 PROCEDURE — 96361 HYDRATE IV INFUSION ADD-ON: CPT

## 2018-09-01 PROCEDURE — 63600175 PHARM REV CODE 636 W HCPCS: Performed by: INTERNAL MEDICINE

## 2018-09-01 PROCEDURE — 83735 ASSAY OF MAGNESIUM: CPT

## 2018-09-01 PROCEDURE — 21400001 HC TELEMETRY ROOM

## 2018-09-01 PROCEDURE — G0378 HOSPITAL OBSERVATION PER HR: HCPCS

## 2018-09-01 PROCEDURE — 84100 ASSAY OF PHOSPHORUS: CPT

## 2018-09-01 PROCEDURE — 80053 COMPREHEN METABOLIC PANEL: CPT

## 2018-09-01 PROCEDURE — 25000242 PHARM REV CODE 250 ALT 637 W/ HCPCS: Performed by: INTERNAL MEDICINE

## 2018-09-01 RX ORDER — BUTALBITAL, ACETAMINOPHEN AND CAFFEINE 50; 325; 40 MG/1; MG/1; MG/1
2 TABLET ORAL EVERY 6 HOURS PRN
Status: DISCONTINUED | OUTPATIENT
Start: 2018-09-01 | End: 2018-09-02 | Stop reason: HOSPADM

## 2018-09-01 RX ORDER — BUDESONIDE 0.25 MG/2ML
0.25 INHALANT ORAL 2 TIMES DAILY
Status: DISCONTINUED | OUTPATIENT
Start: 2018-09-01 | End: 2018-09-02 | Stop reason: HOSPADM

## 2018-09-01 RX ORDER — ARFORMOTEROL TARTRATE 15 UG/2ML
15 SOLUTION RESPIRATORY (INHALATION) 2 TIMES DAILY
Status: DISCONTINUED | OUTPATIENT
Start: 2018-09-01 | End: 2018-09-02 | Stop reason: HOSPADM

## 2018-09-01 RX ORDER — ACETAMINOPHEN 325 MG/1
650 TABLET ORAL EVERY 6 HOURS PRN
Status: DISCONTINUED | OUTPATIENT
Start: 2018-09-01 | End: 2018-09-02 | Stop reason: HOSPADM

## 2018-09-01 RX ADMIN — BUTALBITAL, ACETAMINOPHEN, AND CAFFEINE 2 TABLET: 50; 325; 40 TABLET ORAL at 09:09

## 2018-09-01 RX ADMIN — NICOTINE 1 PATCH: 21 PATCH, EXTENDED RELEASE TRANSDERMAL at 09:09

## 2018-09-01 RX ADMIN — CLOPIDOGREL BISULFATE 75 MG: 75 TABLET, FILM COATED ORAL at 09:09

## 2018-09-01 RX ADMIN — CARVEDILOL 25 MG: 12.5 TABLET, FILM COATED ORAL at 05:09

## 2018-09-01 RX ADMIN — PANTOPRAZOLE SODIUM 40 MG: 40 TABLET, DELAYED RELEASE ORAL at 09:09

## 2018-09-01 RX ADMIN — BUDESONIDE 0.25 MG: 0.25 SUSPENSION RESPIRATORY (INHALATION) at 08:09

## 2018-09-01 RX ADMIN — METHYLPREDNISOLONE SODIUM SUCCINATE 80 MG: 125 INJECTION, POWDER, FOR SOLUTION INTRAMUSCULAR; INTRAVENOUS at 06:09

## 2018-09-01 RX ADMIN — ACETAMINOPHEN 650 MG: 325 TABLET ORAL at 07:09

## 2018-09-01 RX ADMIN — ONDANSETRON 4 MG: 2 INJECTION INTRAMUSCULAR; INTRAVENOUS at 10:09

## 2018-09-01 RX ADMIN — CEFTRIAXONE 1 G: 1 INJECTION, SOLUTION INTRAVENOUS at 11:09

## 2018-09-01 RX ADMIN — BUTALBITAL, ACETAMINOPHEN, AND CAFFEINE 2 TABLET: 50; 325; 40 TABLET ORAL at 11:09

## 2018-09-01 RX ADMIN — SODIUM CHLORIDE: 0.9 INJECTION, SOLUTION INTRAVENOUS at 11:09

## 2018-09-01 RX ADMIN — CEFTRIAXONE 1 G: 1 INJECTION, SOLUTION INTRAVENOUS at 12:09

## 2018-09-01 RX ADMIN — SODIUM CHLORIDE: 0.9 INJECTION, SOLUTION INTRAVENOUS at 12:09

## 2018-09-01 RX ADMIN — ZOLPIDEM TARTRATE 5 MG: 5 TABLET ORAL at 12:09

## 2018-09-01 RX ADMIN — LOVASTATIN 40 MG: 20 TABLET ORAL at 09:09

## 2018-09-01 RX ADMIN — ZOLPIDEM TARTRATE 5 MG: 5 TABLET ORAL at 11:09

## 2018-09-01 RX ADMIN — METHYLPREDNISOLONE SODIUM SUCCINATE 80 MG: 125 INJECTION, POWDER, FOR SOLUTION INTRAMUSCULAR; INTRAVENOUS at 09:09

## 2018-09-01 RX ADMIN — IPRATROPIUM BROMIDE AND ALBUTEROL SULFATE 3 ML: .5; 3 SOLUTION RESPIRATORY (INHALATION) at 12:09

## 2018-09-01 RX ADMIN — CELECOXIB 100 MG: 100 CAPSULE ORAL at 09:09

## 2018-09-01 RX ADMIN — AZITHROMYCIN MONOHYDRATE 500 MG: 500 INJECTION, POWDER, LYOPHILIZED, FOR SOLUTION INTRAVENOUS at 01:09

## 2018-09-01 RX ADMIN — ARFORMOTEROL TARTRATE 15 MCG: 15 SOLUTION RESPIRATORY (INHALATION) at 07:09

## 2018-09-01 RX ADMIN — IPRATROPIUM BROMIDE AND ALBUTEROL SULFATE 3 ML: .5; 3 SOLUTION RESPIRATORY (INHALATION) at 07:09

## 2018-09-01 RX ADMIN — IPRATROPIUM BROMIDE AND ALBUTEROL SULFATE 3 ML: .5; 3 SOLUTION RESPIRATORY (INHALATION) at 08:09

## 2018-09-01 RX ADMIN — CELECOXIB 100 MG: 100 CAPSULE ORAL at 12:09

## 2018-09-01 RX ADMIN — ENOXAPARIN SODIUM 40 MG: 100 INJECTION SUBCUTANEOUS at 05:09

## 2018-09-01 RX ADMIN — CARVEDILOL 25 MG: 12.5 TABLET, FILM COATED ORAL at 07:09

## 2018-09-01 RX ADMIN — METHYLPREDNISOLONE SODIUM SUCCINATE 80 MG: 125 INJECTION, POWDER, FOR SOLUTION INTRAMUSCULAR; INTRAVENOUS at 01:09

## 2018-09-01 RX ADMIN — ENOXAPARIN SODIUM 40 MG: 100 INJECTION SUBCUTANEOUS at 12:09

## 2018-09-01 NOTE — SUBJECTIVE & OBJECTIVE
Past Medical History:   Diagnosis Date    COPD (chronic obstructive pulmonary disease)     Hypercholesteremia     Hypertension     Insomnia     Renal disorder     kidney stones       Past Surgical History:   Procedure Laterality Date    CAROTID ENDARTERECTOMY      CAROTID STENT      KIDNEY STONE SURGERY         Review of patient's allergies indicates:  No Known Allergies    No current facility-administered medications on file prior to encounter.      Current Outpatient Medications on File Prior to Encounter   Medication Sig    albuterol (PROVENTIL) 2.5 mg /3 mL (0.083 %) nebulizer solution Take 2.5 mg by nebulization every 8 (eight) hours as needed for Wheezing. Rescue    b complex vitamins tablet Take 1 tablet by mouth once daily.    carvedilol (COREG) 25 MG tablet Take 25 mg by mouth 2 (two) times daily with meals.    clopidogrel (PLAVIX) 75 mg tablet Take 75 mg by mouth once daily.    ipratropium-albuterol (COMBIVENT)  mcg/actuation inhaler Inhale 1 puff into the lungs every 6 (six) hours as needed for Wheezing. Rescue    lisinopril-hydrochlorothiazide (PRINZIDE,ZESTORETIC) 20-25 mg Tab Take 1 tablet by mouth once daily.    lovastatin (MEVACOR) 40 MG tablet Take 40 mg by mouth every evening.    pantoprazole (PROTONIX) 40 MG tablet Take 40 mg by mouth once daily.    predniSONE (DELTASONE) 20 MG tablet 3 daily x 3 days, 2 daily x 3 days, 1 daily x 3 days, 1/2 daily x 4 days.    roflumilast 500 mcg Tab Take 1 tablet (500 mcg total) by mouth once daily.    TRAZODONE HCL/DIET8 (TRAZAMINE ORAL) Take 100 mg by mouth every evening. Take 1.5 tablets     umeclidinium-vilanterol (ANORO ELLIPTA) 62.5-25 mcg/actuation DsDv Inhale 62.5 mcg into the lungs once daily. Controller    [DISCONTINUED] melatonin 10 mg Tab Take by mouth.     Family History     Problem Relation (Age of Onset)    Dementia Father    Diabetes Brother    Heart disease Father, Brother, Brother    Hypertension Father, Brother     Stroke Mother        Tobacco Use    Smoking status: Current Every Day Smoker     Packs/day: 2.00     Years: 49.00     Pack years: 98.00     Types: Cigarettes     Start date: 1969    Smokeless tobacco: Never Used    Tobacco comment: weaned down to 1/2 pack day since Jan 2018    Substance and Sexual Activity    Alcohol use: Yes     Alcohol/week: 3.0 oz     Types: 5 Cans of beer per week     Comment: 3 beers on weekend     Drug use: No    Sexual activity: Not on file     Review of Systems   Constitutional: Positive for appetite change, chills, diaphoresis, fatigue and fever.   HENT: Negative for congestion, rhinorrhea, sore throat and trouble swallowing.    Eyes: Negative for photophobia and visual disturbance.   Respiratory: Positive for cough (chronic nonproductive), shortness of breath and wheezing. Negative for chest tightness.    Cardiovascular: Negative for chest pain, palpitations and leg swelling.   Gastrointestinal: Negative for abdominal pain, diarrhea, nausea and vomiting.   Endocrine: Negative for polyphagia and polyuria.   Genitourinary: Negative for dysuria, flank pain and hematuria.   Musculoskeletal: Negative for back pain and neck pain.   Skin: Negative for rash.   Allergic/Immunologic: Negative for immunocompromised state.   Neurological: Positive for dizziness, weakness (Generalized) and headaches. Negative for seizures and syncope.   Hematological: Negative for adenopathy. Does not bruise/bleed easily.   Psychiatric/Behavioral: Positive for sleep disturbance. Negative for agitation, confusion, decreased concentration and hallucinations. The patient is not nervous/anxious.    All other systems reviewed and are negative.    Objective:     Vital Signs (Most Recent):  Temp: (!) 100.6 °F (38.1 °C) (08/31/18 2101)  Pulse: 101 (08/31/18 2250)  Resp: 18 (08/31/18 2250)  BP: 112/61 (08/31/18 2232)  SpO2: 96 % (08/31/18 2250) Vital Signs (24h Range):  Temp:  [100.6 °F (38.1 °C)-101.6 °F (38.7 °C)] 100.6  °F (38.1 °C)  Pulse:  [101-122] 101  Resp:  [18-26] 18  SpO2:  [94 %-100 %] 96 %  BP: ()/(52-64) 112/61     Weight: 89.1 kg (196 lb 6.9 oz)  Body mass index is 28.18 kg/m².    Physical Exam   Constitutional: He is oriented to person, place, and time. No distress.   Appears uncomfortable, wheezing mildly, in no respiratory distress.   HENT:   Head: Normocephalic and atraumatic.   Eyes: Conjunctivae and EOM are normal. Pupils are equal, round, and reactive to light. No scleral icterus.   Neck: Normal range of motion. Neck supple. No JVD present.   Cardiovascular: Normal heart sounds and intact distal pulses. Tachycardia present.   No murmur heard.  Pulmonary/Chest: Effort normal. He has wheezes (Bilateral expiratory wheezes).   Abdominal: Soft. He exhibits no distension and no mass. There is no tenderness.   Musculoskeletal: Normal range of motion. He exhibits no edema or tenderness.   Lymphadenopathy:     He has no cervical adenopathy.   Neurological: He is alert and oriented to person, place, and time. No cranial nerve deficit. Coordination normal.   Skin: Skin is warm. Capillary refill takes 2 to 3 seconds. He is not diaphoretic. No erythema.   Psychiatric: His speech is normal and behavior is normal. Thought content normal. His mood appears anxious.   Nursing note and vitals reviewed.        CRANIAL NERVES     CN III, IV, VI   Pupils are equal, round, and reactive to light.  Extraocular motions are normal.        Significant Labs:   Bilirubin:   Recent Labs   Lab  08/31/18 1950   BILITOT  0.4     Blood Culture: No results for input(s): LABBLOO in the last 48 hours.  BMP:   Recent Labs   Lab  08/31/18 1950   GLU  112*   NA  131*   K  4.7   CL  97   CO2  23   BUN  26*   CREATININE  1.0   CALCIUM  9.0     CBC:   Recent Labs   Lab  08/31/18 1950   WBC  14.38*   HGB  10.9*   HCT  36.8*   PLT  189     CMP:   Recent Labs   Lab  08/31/18 1950   NA  131*   K  4.7   CL  97   CO2  23   GLU  112*   BUN  26*    CREATININE  1.0   CALCIUM  9.0   PROT  7.6   ALBUMIN  3.1*   BILITOT  0.4   ALKPHOS  106   AST  61*   ALT  37   ANIONGAP  11   EGFRNONAA  >60     Lactic Acid:   Recent Labs   Lab  08/31/18 1950   LACTATE  1.0     Respiratory Culture: No results for input(s): GSRESP, RESPIRATORYC in the last 48 hours.  Troponin:   Recent Labs   Lab  08/31/18 1950   TROPONINI  0.012     Urine Culture: No results for input(s): LABURIN in the last 48 hours.  Urine Studies:   Recent Labs   Lab  08/31/18 2126   COLORU  Yellow   APPEARANCEUA  Clear   PHUR  6.0   SPECGRAV  1.015   PROTEINUA  Negative   GLUCUA  Negative   KETONESU  Negative   BILIRUBINUA  Negative   OCCULTUA  Negative   NITRITE  Negative   UROBILINOGEN  Negative   LEUKOCYTESUR  Negative     All pertinent labs within the past 24 hours have been reviewed.    Significant Imaging: I have reviewed and interpreted all pertinent imaging results/findings within the past 24 hours.     Imaging Results          X-Ray Chest AP Portable (Final result)  Result time 08/31/18 20:22:18    Final result by Angel Cantrell MD (08/31/18 20:22:18)                 Impression:      Questionable increased density medial left upper lobe near the apex of this may be summation shadow.  Short term follow up repeat PA chest and apical lordotic view recommended as lung nodule not excluded.      Electronically signed by: Angel Cantrell MD  Date:    08/31/2018  Time:    20:22             Narrative:    EXAMINATION:  XR CHEST AP PORTABLE    CLINICAL HISTORY:  Sepsis;    TECHNIQUE:  Single frontal view of the chest was performed.    COMPARISON:  Five hundred eighteen    FINDINGS:  The cardiomediastinal silhouette is normal.    The lungs are clear.    No effusions.                                  I have independently reviewed and interpreted the EKG.     I have independently reviewed all pertinent labs within the past 24 hours.    I have independently reviewed, visualized and interpreted all pertinent  imaging results within the past 24 hours and discussed the findings with the ED physician, Dr. Jordan.

## 2018-09-01 NOTE — ASSESSMENT & PLAN NOTE
Chest x-ray reveals left upper lobe infiltrate.  Will follow up on CT chest with contrast for better evaluation (patient is a chronic tobacco use, rule out mass).  Bronchodilators every 6 hr scheduled.  Continue home dose long-acting bronchodilators.  Follow up on cultures. 9/1- CXR showed questionable increased density medial left upper lobe near the apex, however CT of chest with contrast showed no acute findings and lungs are clear. Continue IV Rocephin and IV Zithromax daily for now, will desolate soon.

## 2018-09-01 NOTE — HOSPITAL COURSE
Mr Aranda is a 62 year old male with COPD, HTN, tobacco abuse, carotid artery disease with stent placement on Plavix who presented to [facility] Emergency Room with increase shortness of breath. Associated symptoms include fever and coughing. Chest X ray showed questionable increased density medial left upper lobe near the apex of this may be summation shadow. CT of chest with contrast showed no acute findings and lungs are clear, pneumonia ruled out. Lactic Acid normal. Patient was started on nebs, IV steroids and IV antibiotics on yesterday. He is feeling much better today. Denies chest pain or increase shortness of breath. Patient has been ambulation up and down halls without difficult. O 2 saturations stable on room air. Will discharge patient of steroid taper, he will follow up with Pulmonary Clinic as outpatient. He was seen, examined and deemed suitable for discharge.

## 2018-09-01 NOTE — PLAN OF CARE
Problem: Patient Care Overview  Goal: Plan of Care Review  Outcome: Ongoing (interventions implemented as appropriate)  AAOX4. No c/o pain. No respiratory distress. VSS. Afebrile. O2 at 2L via NC in use. Expiratory wheezing has been present throughout shift. Pt voiding well. Ambulating well. IVF infusing to site w/o difficulty. Safety measures in use. Will continue to monitor. Shi Ojeda

## 2018-09-01 NOTE — SUBJECTIVE & OBJECTIVE
Interval History: Patient seen and examined. He continues to have significant expiratory wheezing throughout. Will continue duo nebs and IV steriods, add Brovana and budesonide.     Review of Systems   Constitutional: Positive for appetite change, fatigue and fever. Negative for chills and diaphoresis.   HENT: Negative for congestion, ear discharge, rhinorrhea, sinus pressure, sore throat and trouble swallowing.    Eyes: Negative for discharge and visual disturbance.   Respiratory: Positive for shortness of breath and wheezing. Negative for apnea, cough, choking, chest tightness and stridor.         TAMAYO    Cardiovascular: Negative for chest pain, palpitations and leg swelling.   Gastrointestinal: Negative for abdominal distention, abdominal pain, diarrhea, nausea and vomiting.   Endocrine: Negative for cold intolerance and heat intolerance.   Genitourinary: Negative for dysuria, frequency and hematuria.   Musculoskeletal: Negative for arthralgias, back pain, myalgias and neck pain.   Skin: Negative for pallor and rash.   Neurological: Positive for weakness. Negative for dizziness, seizures, syncope and headaches.   Psychiatric/Behavioral: Negative for agitation, confusion and sleep disturbance.     Objective:     Vital Signs (Most Recent):  Temp: 97.6 °F (36.4 °C) (09/01/18 1319)  Pulse: 81 (09/01/18 1319)  Resp: 15 (09/01/18 1319)  BP: (!) 140/67 (09/01/18 1319)  SpO2: 98 % (09/01/18 1319) Vital Signs (24h Range):  Temp:  [97.6 °F (36.4 °C)-101.6 °F (38.7 °C)] 97.6 °F (36.4 °C)  Pulse:  [] 81  Resp:  [15-26] 15  SpO2:  [93 %-100 %] 98 %  BP: ()/(52-73) 140/67     Weight: 90.9 kg (200 lb 6.4 oz)  Body mass index is 28.75 kg/m².    Intake/Output Summary (Last 24 hours) at 9/1/2018 1359  Last data filed at 9/1/2018 0800  Gross per 24 hour   Intake 963.33 ml   Output 860 ml   Net 103.33 ml      Physical Exam   Constitutional: He is oriented to person, place, and time. No distress.   HENT:   Mouth/Throat: No  oropharyngeal exudate.   Eyes: Right eye exhibits no discharge. Left eye exhibits no discharge.   Cardiovascular: Exam reveals no gallop and no friction rub.   No murmur heard.  Pulmonary/Chest: No stridor. No respiratory distress. He has wheezes in the right upper field, the right middle field, the right lower field, the left upper field, the left middle field and the left lower field. He has rhonchi. He has no rales. He exhibits no tenderness.   Abdominal: He exhibits no distension and no mass. There is no tenderness. There is no rebound and no guarding. No hernia.   Musculoskeletal: He exhibits no edema or deformity.   Neurological: He is alert and oriented to person, place, and time.   Skin: Skin is warm and dry. Capillary refill takes less than 2 seconds. He is not diaphoretic.   Nursing note and vitals reviewed.      Significant Labs:   CBC:   Recent Labs   Lab  08/31/18   1950  09/01/18 0612   WBC  14.38*  7.71   HGB  10.9*  9.2*   HCT  36.8*  32.2*   PLT  189  165     CMP:   Recent Labs   Lab  08/31/18   1950  09/01/18 0612   NA  131*  134*   K  4.7  4.2   CL  97  104   CO2  23  23   GLU  112*  147*   BUN  26*  18   CREATININE  1.0  0.8   CALCIUM  9.0  8.3*   PROT  7.6  6.4   ALBUMIN  3.1*  2.7*   BILITOT  0.4  0.3   ALKPHOS  106  84   AST  61*  44*   ALT  37  30   ANIONGAP  11  7*   EGFRNONAA  >60  >60       Significant Imaging:     Imaging Results          CT Chest With Contrast (Final result)  Result time 09/01/18 00:28:05    Final result by Angel Cantrell MD (09/01/18 00:28:05)                 Impression:      No acute findings.  Lungs are clear.    All CT scans at this facility use dose modulation, iterative reconstruction and/or weight based dosing when appropriate to reduce radiation dose to as low as reasonably achievable.      Electronically signed by: Angel Cantrell MD  Date:    09/01/2018  Time:    00:28             Narrative:    EXAMINATION:  CT CHEST WITH CONTRAST    CLINICAL  HISTORY:  Sepsis; abnormal chest x-ray.    TECHNIQUE:  Axial images through the chest following IV contrast administration.  Sagittal and coronal reformats obtained.    COMPARISON:  Earlier chest x-ray    FINDINGS  Heart: Normal size.  No pericardial effusion.  Coronary arterial calcifications.    Mediastinum: No adenopathy.  Unremarkable.    Vasculature: Negative for aneurysm.  Mild aortic atherosclerosis.    Lungs: Clear lungs. No pleural effusion.    Esophagus: Unremarkable.    Upper Abdomen: Question small gallstone.    Bones: No acute fracture. Low-to-moderate grade degenerative change in the spine.                               X-Ray Chest AP Portable (Final result)  Result time 08/31/18 20:22:18    Final result by Angel Cantrell MD (08/31/18 20:22:18)                 Impression:      Questionable increased density medial left upper lobe near the apex of this may be summation shadow.  Short term follow up repeat PA chest and apical lordotic view recommended as lung nodule not excluded.      Electronically signed by: Angel Cantrell MD  Date:    08/31/2018  Time:    20:22             Narrative:    EXAMINATION:  XR CHEST AP PORTABLE    CLINICAL HISTORY:  Sepsis;    TECHNIQUE:  Single frontal view of the chest was performed.    COMPARISON:  [facility]    FINDINGS:  The cardiomediastinal silhouette is normal.    The lungs are clear.    No effusions.

## 2018-09-01 NOTE — PROGRESS NOTES
[facility] Hospital Medicine  Progress Note    Patient Name: Filemon Aranda  MRN: [id number]  Patient Class: IP- Inpatient   Admission Date: 8/31/2018  Length of Stay: 1 days  Attending Physician: Luca Brooks MD  Primary Care Provider: Jacob Ferreira MD        Subjective:     Principal Problem:Sepsis    HPI:  Mr. Aranda is a 62-year-old  male with history of COPD, tobacco use, HTN, carotid artery disease with stent placement, on Plavix, presents to the ED complaining of worsening shortness of breath associated with fever, chills for the past one week.  He also describes exertional dyspnea, chronic nonproductive cough along with headache.  Reports fever of 101.4 at home.  Temperature here 101.6, heart rate 118.  WBC 14,000, lactic acid 1.0.  Chest x-ray reveals mild left upper lobe medial infiltrate.  Patient received normal saline 30 mL/kilogram bolus in the ED along with the IV vancomycin x1 and IV Zosyn x1.  Blood cultures have been obtained.  Patient also has been wheezing, received prednisone 60 mg in the ED. Patient admitted with a diagnosis of sepsis secondary to pneumonia.    Hospital Course:  Mr Aranda is a 62 year old male with COPD, HTN, tobacco abuse, carotid artery disease with stent placement on Plavix who presented to [facility] Emergency Room with increase shortness of breath. Associated symptoms include fever and coughing. Chest X ray showed questionable increased density medial left upper lobe near the apex of this may be summation shadow. CT of chest with contrast showed no acute findings and lungs are clear.    Interval History: Patient seen and examined. He continues to have significant expiratory wheezing throughout. Will continue duo nebs and IV steriods, add Brovana and budesonide.     Review of Systems   Constitutional: Positive for appetite change, fatigue and fever. Negative for chills and diaphoresis.   HENT: Negative for congestion, ear discharge, rhinorrhea,  sinus pressure, sore throat and trouble swallowing.    Eyes: Negative for discharge and visual disturbance.   Respiratory: Positive for shortness of breath and wheezing. Negative for apnea, cough, choking, chest tightness and stridor.         TAMAYO    Cardiovascular: Negative for chest pain, palpitations and leg swelling.   Gastrointestinal: Negative for abdominal distention, abdominal pain, diarrhea, nausea and vomiting.   Endocrine: Negative for cold intolerance and heat intolerance.   Genitourinary: Negative for dysuria, frequency and hematuria.   Musculoskeletal: Negative for arthralgias, back pain, myalgias and neck pain.   Skin: Negative for pallor and rash.   Neurological: Positive for weakness. Negative for dizziness, seizures, syncope and headaches.   Psychiatric/Behavioral: Negative for agitation, confusion and sleep disturbance.     Objective:     Vital Signs (Most Recent):  Temp: 97.6 °F (36.4 °C) (09/01/18 1319)  Pulse: 81 (09/01/18 1319)  Resp: 15 (09/01/18 1319)  BP: (!) 140/67 (09/01/18 1319)  SpO2: 98 % (09/01/18 1319) Vital Signs (24h Range):  Temp:  [97.6 °F (36.4 °C)-101.6 °F (38.7 °C)] 97.6 °F (36.4 °C)  Pulse:  [] 81  Resp:  [15-26] 15  SpO2:  [93 %-100 %] 98 %  BP: ()/(52-73) 140/67     Weight: 90.9 kg (200 lb 6.4 oz)  Body mass index is 28.75 kg/m².    Intake/Output Summary (Last 24 hours) at 9/1/2018 1359  Last data filed at 9/1/2018 0800  Gross per 24 hour   Intake 963.33 ml   Output 860 ml   Net 103.33 ml      Physical Exam   Constitutional: He is oriented to person, place, and time. No distress.   HENT:   Mouth/Throat: No oropharyngeal exudate.   Eyes: Right eye exhibits no discharge. Left eye exhibits no discharge.   Cardiovascular: Exam reveals no gallop and no friction rub.   No murmur heard.  Pulmonary/Chest: No stridor. No respiratory distress. He has wheezes in the right upper field, the right middle field, the right lower field, the left upper field, the left middle field  and the left lower field. He has rhonchi. He has no rales. He exhibits no tenderness.   Abdominal: He exhibits no distension and no mass. There is no tenderness. There is no rebound and no guarding. No hernia.   Musculoskeletal: He exhibits no edema or deformity.   Neurological: He is alert and oriented to person, place, and time.   Skin: Skin is warm and dry. Capillary refill takes less than 2 seconds. He is not diaphoretic.   Nursing note and vitals reviewed.      Significant Labs:   CBC:   Recent Labs   Lab  08/31/18   1950  09/01/18 0612   WBC  14.38*  7.71   HGB  10.9*  9.2*   HCT  36.8*  32.2*   PLT  189  165     CMP:   Recent Labs   Lab  08/31/18   1950  09/01/18 0612   NA  131*  134*   K  4.7  4.2   CL  97  104   CO2  23  23   GLU  112*  147*   BUN  26*  18   CREATININE  1.0  0.8   CALCIUM  9.0  8.3*   PROT  7.6  6.4   ALBUMIN  3.1*  2.7*   BILITOT  0.4  0.3   ALKPHOS  106  84   AST  61*  44*   ALT  37  30   ANIONGAP  11  7*   EGFRNONAA  >60  >60       Significant Imaging:     Imaging Results          CT Chest With Contrast (Final result)  Result time 09/01/18 00:28:05    Final result by Angel Cantrell MD (09/01/18 00:28:05)                 Impression:      No acute findings.  Lungs are clear.    All CT scans at this facility use dose modulation, iterative reconstruction and/or weight based dosing when appropriate to reduce radiation dose to as low as reasonably achievable.      Electronically signed by: Angel Cantrell MD  Date:    09/01/2018  Time:    00:28             Narrative:    EXAMINATION:  CT CHEST WITH CONTRAST    CLINICAL HISTORY:  Sepsis; abnormal chest x-ray.    TECHNIQUE:  Axial images through the chest following IV contrast administration.  Sagittal and coronal reformats obtained.    COMPARISON:  Earlier chest x-ray    FINDINGS  Heart: Normal size.  No pericardial effusion.  Coronary arterial calcifications.    Mediastinum: No adenopathy.  Unremarkable.    Vasculature: Negative for aneurysm.   Mild aortic atherosclerosis.    Lungs: Clear lungs. No pleural effusion.    Esophagus: Unremarkable.    Upper Abdomen: Question small gallstone.    Bones: No acute fracture. Low-to-moderate grade degenerative change in the spine.                               X-Ray Chest AP Portable (Final result)  Result time 08/31/18 20:22:18    Final result by Angel Cantrell MD (08/31/18 20:22:18)                 Impression:      Questionable increased density medial left upper lobe near the apex of this may be summation shadow.  Short term follow up repeat PA chest and apical lordotic view recommended as lung nodule not excluded.      Electronically signed by: Angel Cantrell MD  Date:    08/31/2018  Time:    20:22             Narrative:    EXAMINATION:  XR CHEST AP PORTABLE    CLINICAL HISTORY:  Sepsis;    TECHNIQUE:  Single frontal view of the chest was performed.    COMPARISON:  [facility]    FINDINGS:  The cardiomediastinal silhouette is normal.    The lungs are clear.    No effusions.                              Assessment/Plan:      * Sepsis    Temperature 101.6°, heart rate 118, WBC 66375, lactic acid 1.0.  Source left upper lobe pneumonia.  Patient received IV vancomycin, IV Zosyn in the ED.  Continue with IV Rocephin and IV Zithromax daily.  Follow up on blood and sputum cultures.  Patient received normal saline 30 mL/kilogram bolus in the ED.    9/1/2018- Patient seen and examined. CXR showed questionable increased density medial left upper lobe near the apex, however CT of chest with contrast showed no acute findings and lungs are clear. Continue IV Rocephin and IV Zithromax daily for now. Monitor labs and cultures.           COPD exacerbation    Patient has expiratory wheezing.  Chronic tobacco user.  Solu-Medrol 80 mg IV q.8.  Continue bronchodilators.    9/1/2018-   He continues to have significant wheezing, O 2 at 2 L, keep sats > 90%  Continue Duo nebs and IV steroids   Start [place] and budesonide            Pneumonia due to infectious organism    Chest x-ray reveals left upper lobe infiltrate.  Will follow up on CT chest with contrast for better evaluation (patient is a chronic tobacco use, rule out mass).  Bronchodilators every 6 hr scheduled.  Continue home dose long-acting bronchodilators.  Follow up on cultures. 9/1- CXR showed questionable increased density medial left upper lobe near the apex, however CT of chest with contrast showed no acute findings and lungs are clear. Continue IV Rocephin and IV Zithromax daily for now, will desolate soon.                Essential hypertension    Blood pressure on the low side in the setting of sepsis (systolic low 100s).    Will hold ACE inhibitor and diuretics.  Continue beta-blocker.  Monitor blood pressure closely and adjust medications as needed.          Tobacco dependence due to cigarettes    Chronic tobacco use, patient reports that he has significantly cut back.  Continue with nicotine patch.          Hyperlipidemia    Continue lovastatin           Bronchitis                VTE Risk Mitigation (From admission, onward)        Ordered     enoxaparin injection 40 mg  Daily      08/31/18 2322     Place sequential compression device  Until discontinued      08/31/18 2322              Eliezer Wells NP  Department of Hospital Medicine   [facility]

## 2018-09-01 NOTE — PLAN OF CARE
Met with patient and family. Patient denies any post hospital needs or services at this time.  Patient is independent with his alds and iadls. His wife and daughter provide his transportation. Patient currently has a  home nebulizer, but is not on home oxygen. Will need home o2 eval upon discharge.  Will continue to follow. Transitional Care Folder, Discharge Planning Begins on Admission pamphlet, [facility] Delivery pamphlet, Advance Directive information given to patient along with the contact information given.Instructed patient or family to call with any questions or concerns.        [employer] [facility] [address]   [address] [facility] [address]  Phone: [phone] Fax: [phone]    Jacob Ferreira MD  Payor: MEDICAID / Plan: [facility]) / Product Type: Managed Medicaid /           09/01/18 1328   Discharge Assessment   Assessment Type Discharge Planning Assessment   Confirmed/corrected address and phone number on facesheet? Yes   Assessment information obtained from? Patient;Caregiver;Medical Record   Expected Length of Stay (days) (tbd)   Communicated expected length of stay with patient/caregiver yes   Prior to hospitilization cognitive status: Alert/Oriented   Prior to hospitalization functional status: Independent   Current cognitive status: Alert/Oriented   Current Functional Status: Independent   Facility Arrived From: home   Lives With spouse   Able to Return to Prior Arrangements yes   Is patient able to care for self after discharge? Yes   Who are your caregiver(s) and their phone number(s)? Teresa Aranda ( spouse ) [phone]   Patient's perception of discharge disposition home or selfcare   Readmission Within The Last 30 Days no previous admission in last 30 days   Patient currently being followed by outpatient case management? No   Patient currently receives any other outside agency services? No    Equipment Currently Used at Home nebulizer   Do you have any problems affording any of your prescribed medications? No   Is the patient taking medications as prescribed? yes   Does the patient have transportation home? Yes   Transportation Available car;family or friend will provide   Does the patient receive services at the [facility]? No   Discharge Plan A Home;Home with family   Discharge Plan B Home   Patient/Family In Agreement With Plan yes

## 2018-09-01 NOTE — ASSESSMENT & PLAN NOTE
Patient has expiratory wheezing.  Chronic tobacco user.  Solu-Medrol 80 mg IV q.8.  Continue bronchodilators.

## 2018-09-01 NOTE — ED PROVIDER NOTES
"SCRIBE #1 NOTE: I, Valarie Jarquin/Galina Sandy, am scribing for, and in the presence of, Kee Jordan MD. I have scribed the entire note.      History      Chief Complaint   Patient presents with    Shortness of Breath     Pt c/o SOB, productive cough, fever x a week, Hx of emphysema, states "I am wondering if I have pneumonia."       Review of patient's allergies indicates:  No Known Allergies     HPI   HPI    8/31/2018, 8:27 PM   History obtained from the patient      History of Present Illness: Filemon Aranda is a 62 y.o. male patient with a PMHx of COPD, emphysema, and HTN who presents to the Emergency Department for SOB which onset gradually one week ago. Symptoms are constant and moderate in severity. No mitigating or exacerbating factors reported. Associated sxs include fever, chills, productive cough, nausea, vomiting, headache. Prior tx includes BC powder with no relief. Pt describes episodes of emesis as choking up food when eating. Pt states he was dx with pneumonia in May and states current sxs feel similar to that. Patient denies any recent travel, long car trips, leg pain/swelling, palpitations, CP, abd pain, extremity weakness/numbness, dizziness, dysuria, and all other sxs at this time. Pt reports being on Plavix and aspirin after having carotid stent placement and carotid endarterectomy. No further complaints or concerns at this time.     Arrival mode: Personal vehicle      PCP: Jacob Ferreira MD       Past Medical History:  Past Medical History:   Diagnosis Date    COPD (chronic obstructive pulmonary disease)     Hypercholesteremia     Hypertension     Insomnia     Renal disorder     kidney stones       Past Surgical History:  Past Surgical History:   Procedure Laterality Date    CAROTID ENDARTERECTOMY      CAROTID STENT      KIDNEY STONE SURGERY           Family History:  Family History   Problem Relation Age of Onset    Stroke Mother     Heart disease Father     Hypertension Father     " Dementia Father     Heart disease Brother     Diabetes Brother     Heart disease Brother     Hypertension Brother        Social History:  Social History     Tobacco Use    Smoking status: Current Every Day Smoker     Packs/day: 2.00     Years: 49.00     Pack years: 98.00     Types: Cigarettes     Start date: 1969    Smokeless tobacco: Never Used    Tobacco comment: weaned down to 1/2 pack day since Jan 2018    Substance and Sexual Activity    Alcohol use: Yes     Alcohol/week: 3.0 oz     Types: 5 Cans of beer per week     Comment: 3 beers on weekend     Drug use: No    Sexual activity: Unknown       ROS   Review of Systems   Constitutional: Positive for chills and fever.   HENT: Negative for sore throat.    Respiratory: Positive for cough and shortness of breath.    Cardiovascular: Negative for chest pain, palpitations and leg swelling.   Gastrointestinal: Positive for nausea and vomiting. Negative for abdominal pain.   Genitourinary: Negative for dysuria.   Musculoskeletal: Negative for back pain.   Skin: Negative for rash.   Neurological: Positive for headaches. Negative for dizziness, weakness and numbness.   Hematological: Does not bruise/bleed easily.   All other systems reviewed and are negative.      Physical Exam      Initial Vitals [08/31/18 1834]   BP Pulse Resp Temp SpO2   (!) 92/52 (!) 118 18 (!) 101.6 °F (38.7 °C) 95 %      MAP       --          Physical Exam  Nursing Notes and Vital Signs Reviewed.  Constitutional: Patient is in no acute distress. Well-developed and well-nourished.  Head: Atraumatic. Normocephalic.  Eyes: PERRL. EOM intact. Conjunctivae are not pale. No scleral icterus.  ENT: Mucous membranes are moist. Oropharynx is clear and symmetric.    Neck: Supple. Full ROM. No lymphadenopathy.  Cardiovascular: Regular rate. Regular rhythm. No murmurs, rubs, or gallops. Distal pulses are 2+ and symmetric.  Pulmonary/Chest: No respiratory distress. Bilateral coarse breath sounds with  bilateral expiratory wheezing  Abdominal: Soft and non-distended.  There is no tenderness.  No rebound, guarding, or rigidity. Good bowel sounds.  Genitourinary: No CVA tenderness  Musculoskeletal: Moves all extremities. No obvious deformities. No edema. No calf tenderness.  Skin: Warm and dry.  Neurological:  Alert, awake, and appropriate.  Normal speech.  No acute focal neurological deficits are appreciated. No nuchal rigidity. GCS 15.   Psychiatric: Normal affect. Good eye contact. Appropriate in content.    ED Course    Critical Care  Date/Time: 8/31/2018 10:33 PM  Performed by: Kee Jordan MD  Authorized by: Kee Jordan MD   Direct patient critical care time: 8 minutes  Additional history critical care time: 8 minutes  Ordering / reviewing critical care time: 7 minutes  Documentation critical care time: 7 minutes  Consulting other physicians critical care time: 5 minutes  Total critical care time (exclusive of procedural time) : 35 minutes  Critical care time was exclusive of separately billable procedures and treating other patients.  Critical care was necessary to treat or prevent imminent or life-threatening deterioration of the following conditions: sepsis.  Critical care was time spent personally by me on the following activities: blood draw for specimens, interpretation of cardiac output measurements, examination of patient, obtaining history from patient or surrogate, ordering and performing treatments and interventions, ordering and review of laboratory studies, pulse oximetry, ordering and review of radiographic studies, re-evaluation of patient's condition, review of old charts, development of treatment plan with patient or surrogate, discussions with consultants and evaluation of patient's response to treatment.        ED Vital Signs:  Vitals:    08/31/18 1834 08/31/18 1941 08/31/18 1942 08/31/18 1949   BP: (!) 92/52 105/64     Pulse: (!) 118 (!) 116 (!) 122 (!) 118   Resp: 18 (!) 26    "  Temp: (!) 101.6 °F (38.7 °C)      TempSrc: Oral      SpO2: 95% 99%     Weight: 89.1 kg (196 lb 6.9 oz)      Height: 5' 10" (1.778 m)       08/31/18 2015 08/31/18 2031 08/31/18 2055 08/31/18 2101   BP:  (!) 104/55  (!) 99/56   Pulse: (!) 118 110 110 109   Resp:  (!) 24 (!) 21 (!) 22   Temp:    (!) 100.6 °F (38.1 °C)   TempSrc:       SpO2: 99% 100% 100% 100%   Weight:       Height:        08/31/18 2212   BP: (!) 103/59   Pulse: 103   Resp: (!) 25   Temp:    TempSrc:    SpO2: 95%   Weight:    Height:        Abnormal Lab Results:  Labs Reviewed   CBC W/ AUTO DIFFERENTIAL - Abnormal; Notable for the following components:       Result Value    WBC 14.38 (*)     RBC 4.29 (*)     Hemoglobin 10.9 (*)     Hematocrit 36.8 (*)     MCH 25.4 (*)     MCHC 29.6 (*)     RDW 22.5 (*)     Lymph # 7.9 (*)     Baso # 0.42 (*)     Gran% 35.5 (*)     Lymph% 55.2 (*)     Basophil% 2.9 (*)     All other components within normal limits   COMPREHENSIVE METABOLIC PANEL - Abnormal; Notable for the following components:    Sodium 131 (*)     Glucose 112 (*)     BUN, Bld 26 (*)     Albumin 3.1 (*)     AST 61 (*)     All other components within normal limits   CULTURE, BLOOD   CULTURE, BLOOD   LACTIC ACID, PLASMA   URINALYSIS, REFLEX TO URINE CULTURE    Narrative:     Preferred Collection Type->Urine, Clean Catch   TROPONIN I   TROPONIN I   LACTIC ACID, PLASMA        All Lab Results:  Results for orders placed or performed during the hospital encounter of 08/31/18   CBC auto differential   Result Value Ref Range    WBC 14.38 (H) 3.90 - 12.70 K/uL    RBC 4.29 (L) 4.60 - 6.20 M/uL    Hemoglobin 10.9 (L) 14.0 - 18.0 g/dL    Hematocrit 36.8 (L) 40.0 - 54.0 %    MCV 86 82 - 98 fL    MCH 25.4 (L) 27.0 - 31.0 pg    MCHC 29.6 (L) 32.0 - 36.0 g/dL    RDW 22.5 (H) 11.5 - 14.5 %    Platelets 189 150 - 350 K/uL    MPV 10.6 9.2 - 12.9 fL    Gran # (ANC) 5.0 1.8 - 7.7 K/uL    Lymph # 7.9 (H) 1.0 - 4.8 K/uL    Mono # 1.0 0.3 - 1.0 K/uL    Eos # 0.1 0.0 - 0.5 " K/uL    Baso # 0.42 (H) 0.00 - 0.20 K/uL    Gran% 35.5 (L) 38.0 - 73.0 %    Lymph% 55.2 (H) 18.0 - 48.0 %    Mono% 7.0 4.0 - 15.0 %    Eosinophil% 0.3 0.0 - 8.0 %    Basophil% 2.9 (H) 0.0 - 1.9 %    Platelet Estimate Appears normal     Aniso Moderate     Poik Slight     Poly Occasional     Hypo Occasional     Ovalocytes Occasional     Target Cells Occasional     Tear Drop Cells Occasional     Stomatocytes Present     Spherocytes Occasional     Differential Method Automated    Comprehensive metabolic panel   Result Value Ref Range    Sodium 131 (L) 136 - 145 mmol/L    Potassium 4.7 3.5 - 5.1 mmol/L    Chloride 97 95 - 110 mmol/L    CO2 23 23 - 29 mmol/L    Glucose 112 (H) 70 - 110 mg/dL    BUN, Bld 26 (H) 8 - 23 mg/dL    Creatinine 1.0 0.5 - 1.4 mg/dL    Calcium 9.0 8.7 - 10.5 mg/dL    Total Protein 7.6 6.0 - 8.4 g/dL    Albumin 3.1 (L) 3.5 - 5.2 g/dL    Total Bilirubin 0.4 0.1 - 1.0 mg/dL    Alkaline Phosphatase 106 55 - 135 U/L    AST 61 (H) 10 - 40 U/L    ALT 37 10 - 44 U/L    Anion Gap 11 8 - 16 mmol/L    eGFR if African American >60 >60 mL/min/1.73 m^2    eGFR if non African American >60 >60 mL/min/1.73 m^2   Lactic acid, plasma #1   Result Value Ref Range    Lactate (Lactic Acid) 1.0 0.5 - 2.2 mmol/L   Urinalysis, Reflex to Urine Culture Urine, Clean Catch   Result Value Ref Range    Specimen UA Urine, Clean Catch     Color, UA Yellow Yellow, Straw, Sarah    Appearance, UA Clear Clear    pH, UA 6.0 5.0 - 8.0    Specific Gravity, UA 1.015 1.005 - 1.030    Protein, UA Negative Negative    Glucose, UA Negative Negative    Ketones, UA Negative Negative    Bilirubin (UA) Negative Negative    Occult Blood UA Negative Negative    Nitrite, UA Negative Negative    Urobilinogen, UA Negative <2.0 EU/dL    Leukocytes, UA Negative Negative   Troponin I   Result Value Ref Range    Troponin I 0.012 0.000 - 0.026 ng/mL         Imaging Results:  Imaging Results          X-Ray Chest AP Portable (Final result)  Result time  08/31/18 20:22:18    Final result by Angel Cantrell MD (08/31/18 20:22:18)                 Impression:      Questionable increased density medial left upper lobe near the apex of this may be summation shadow.  Short term follow up repeat PA chest and apical lordotic view recommended as lung nodule not excluded.      Electronically signed by: Angel Cantrell MD  Date:    08/31/2018  Time:    20:22             Narrative:    EXAMINATION:  XR CHEST AP PORTABLE    CLINICAL HISTORY:  Sepsis;    TECHNIQUE:  Single frontal view of the chest was performed.    COMPARISON:  Five hundred eighteen    FINDINGS:  The cardiomediastinal silhouette is normal.    The lungs are clear.    No effusions.                               The EKG was ordered, reviewed, and independently interpreted by the ED provider.  Interpretation time: 1930  Rate: 116 BPM  Rhythm: sinus tachycardia  Interpretation: Normal axis. Normal Intervals. No ST-T segment abnormality. No STEMI.         The Emergency Provider reviewed the vital signs and test results, which are outlined above.    ED Discussion     10:24 PM: Re-evaluated pt. I have discussed test results, shared treatment plan, and the need for admission with patient and family at bedside. Pt and family express understanding at this time and agree with all information. All questions answered. Pt and family have no further questions or concerns at this time. Pt is ready for admit.    10:26 PM: Discussed case with Dr. Olmos ([facility]). Dr. Olmos agrees with current care and management of pt and accepts admission.   Admitting Service: Hospital medicine   Admitting Physician: Dr. Olmos   Admit to: Tele       ED Medication(s):  Medications   albuterol-ipratropium 2.5 mg-0.5 mg/3 mL nebulizer solution 3 mL (not administered)   sodium chloride 0.9% bolus 2,673 mL (2,673 mLs Intravenous New Bag 8/31/18 2003)   ondansetron injection 4 mg (4 mg Intravenous Given 8/31/18 2004)   albuterol-ipratropium  2.5 mg-0.5 mg/3 mL nebulizer solution 3 mL (3 mLs Nebulization Given 8/31/18 2055)   predniSONE tablet 60 mg (60 mg Oral Given 8/31/18 2034)   vancomycin in dextrose 5 % 1 gram/250 mL IVPB 1,000 mg (1,000 mg Intravenous New Bag 8/31/18 2123)   piperacillin-tazobactam 4.5 g in dextrose 5 % 100 mL IVPB (ready to mix system) (0 g Intravenous Stopped 8/31/18 2125)       New Prescriptions    No medications on file             Medical Decision Making    Medical Decision Making:   Clinical Tests:   Lab Tests: Ordered and Reviewed  Radiological Study: Ordered and Reviewed  Medical Tests: Ordered and Reviewed  Sepsis; 30 ml/kg of IVF plus Vanc and Zosyn given on arrival with undifferentiated sepsis; CXR revealed PNA; COPD treated with steroids and duonebs; patient admitted to medicine           Scribe Attestation:   Scribe #1: I performed the above scribed service and the documentation accurately describes the services I performed. I attest to the accuracy of the note.    Attending:   Physician Attestation Statement for Scribe #1: I, Kee Jordan MD, personally performed the services described in this documentation, as scribed by Valarie Jarquin/Galina Delgado, in my presence, and it is both accurate and complete.          Clinical Impression       ICD-10-CM ICD-9-CM   1. Pneumonia due to infectious organism, unspecified laterality, unspecified part of lung J18.9 136.9     484.8   2. Cough R05 786.2   3. Shortness of breath R06.02 786.05   4. Sepsis, due to unspecified organism A41.9 038.9     995.91   5. COPD exacerbation J44.1 491.21       Disposition:   Disposition: Admitted (Tele)  Condition: Fair         Kee Jordan MD  08/31/18 8598

## 2018-09-01 NOTE — ASSESSMENT & PLAN NOTE
Temperature 101.6°, heart rate 118, WBC 50764, lactic acid 1.0.  Source left upper lobe pneumonia.  Patient received IV vancomycin, IV Zosyn in the ED.  Continue with IV Rocephin and IV Zithromax daily.  Follow up on blood and sputum cultures.  Patient received normal saline 30 mL/kilogram bolus in the ED.    9/1/2018- Patient seen and examined. CXR showed questionable increased density medial left upper lobe near the apex, however CT of chest with contrast showed no acute findings and lungs are clear. Continue IV Rocephin and IV Zithromax daily for now. Monitor labs and cultures.

## 2018-09-01 NOTE — NURSING
Pt transferred to room via stretcher from ED. Bedside report received from Amos. AAOX4. O2 @ 2L in use via nasal cannula. Oriented pt to unit & floor. Current & future orders reviewed. P verbalized understanding. Will continue to monitor.

## 2018-09-01 NOTE — ED NOTES
In bed resting,aaox3,skin warm dry to touch,equal bilateral chest rise and fall,side rails up x 2,bed locked and in low position,C-monitor on and recording,instructed to call with any needs. Patient reports relief with breathing treatment but reports shortness of breath is still present. Lungs reveal on ascultation wheezing on inspiration and expiration along with crackles

## 2018-09-01 NOTE — ASSESSMENT & PLAN NOTE
Temperature 101.6°, heart rate 118, WBC 51924, lactic acid 1.0.  Source left upper lobe pneumonia.  Patient received IV vancomycin, IV Zosyn in the ED.  Continue with IV Rocephin and IV Zithromax daily.  Follow up on blood and sputum cultures.  Patient received normal saline 30 mL/kilogram bolus in the ED.

## 2018-09-01 NOTE — ASSESSMENT & PLAN NOTE
Patient has expiratory wheezing.  Chronic tobacco user.  Solu-Medrol 80 mg IV q.8.  Continue bronchodilators.    9/1/2018-   He continues to have significant wheezing, O 2 at 2 L, keep sats > 90%  Continue Duo nebs and IV steroids   Start [place] and budesonide

## 2018-09-01 NOTE — PLAN OF CARE
Problem: Fall Risk (Adult)  Goal: Absence of Falls  Patient will demonstrate the desired outcomes by discharge/transition of care.  Outcome: Ongoing (interventions implemented as appropriate)   09/01/18 1245   Fall Risk (Adult)   Absence of Falls making progress toward outcome       Comments: Patient's belongings are within reach, as well as call light. Wife is at bedside, patient has no current questions regarding plan of care.

## 2018-09-01 NOTE — ASSESSMENT & PLAN NOTE
Blood pressure on the low side in the setting of sepsis (systolic low 100s).    Will hold ACE inhibitor and diuretics.  Continue beta-blocker.  Monitor blood pressure closely and adjust medications as needed.

## 2018-09-01 NOTE — ASSESSMENT & PLAN NOTE
Chronic tobacco use, patient reports that he has significantly cut back.  Continue with nicotine patch.

## 2018-09-01 NOTE — HPI
Mr. Aranda is a 62-year-old  male with history of COPD, tobacco use, HTN, carotid artery disease with stent placement, on Plavix, presents to the ED complaining of worsening shortness of breath associated with fever, chills for the past one week.  He also describes exertional dyspnea, chronic nonproductive cough along with headache.  Reports fever of 101.4 at home.  Temperature here 101.6, heart rate 118.  WBC 14,000, lactic acid 1.0.  Chest x-ray reveals mild left upper lobe medial infiltrate.  Patient received normal saline 30 mL/kilogram bolus in the ED along with the IV vancomycin x1 and IV Zosyn x1.  Blood cultures have been obtained.  Patient also has been wheezing, received prednisone 60 mg in the ED. Patient admitted with a diagnosis of sepsis secondary to pneumonia.

## 2018-09-01 NOTE — H&P
"[facility] Hospital Medicine  History & Physical    Patient Name: Filemon Aranda  MRN: [id number]  Admission Date: 8/31/2018  Attending Physician: Paco Olmos MD  Primary Care Provider: Jacob Ferreira MD         Patient information was obtained from patient, past medical records and ER records.     Subjective:     Principal Problem:Sepsis    Chief Complaint:   Chief Complaint   Patient presents with    Shortness of Breath     Pt c/o SOB, productive cough, fever x a week, Hx of emphysema, states "I am wondering if I have pneumonia."        HPI: Mr. Aranda is a 62-year-old  male with history of COPD, tobacco use, HTN, carotid artery disease with stent placement, on Plavix, presents to the ED complaining of worsening shortness of breath associated with fever, chills for the past one week.  He also describes exertional dyspnea, chronic nonproductive cough along with headache.  Reports fever of 101.4 at home.  Temperature here 101.6, heart rate 118.  WBC 14,000, lactic acid 1.0.  Chest x-ray reveals mild left upper lobe medial infiltrate.  Patient received normal saline 30 mL/kilogram bolus in the ED along with the IV vancomycin x1 and IV Zosyn x1.  Blood cultures have been obtained.  Patient also has been wheezing, received prednisone 60 mg in the ED. Patient admitted with a diagnosis of sepsis secondary to pneumonia.    Past Medical History:   Diagnosis Date    COPD (chronic obstructive pulmonary disease)     Hypercholesteremia     Hypertension     Insomnia     Renal disorder     kidney stones       Past Surgical History:   Procedure Laterality Date    CAROTID ENDARTERECTOMY      CAROTID STENT      KIDNEY STONE SURGERY         Review of patient's allergies indicates:  No Known Allergies    No current facility-administered medications on file prior to encounter.      Current Outpatient Medications on File Prior to Encounter   Medication Sig    albuterol (PROVENTIL) 2.5 mg /3 mL (0.083 " %) nebulizer solution Take 2.5 mg by nebulization every 8 (eight) hours as needed for Wheezing. Rescue    b complex vitamins tablet Take 1 tablet by mouth once daily.    carvedilol (COREG) 25 MG tablet Take 25 mg by mouth 2 (two) times daily with meals.    clopidogrel (PLAVIX) 75 mg tablet Take 75 mg by mouth once daily.    ipratropium-albuterol (COMBIVENT)  mcg/actuation inhaler Inhale 1 puff into the lungs every 6 (six) hours as needed for Wheezing. Rescue    lisinopril-hydrochlorothiazide (PRINZIDE,ZESTORETIC) 20-25 mg Tab Take 1 tablet by mouth once daily.    lovastatin (MEVACOR) 40 MG tablet Take 40 mg by mouth every evening.    pantoprazole (PROTONIX) 40 MG tablet Take 40 mg by mouth once daily.    predniSONE (DELTASONE) 20 MG tablet 3 daily x 3 days, 2 daily x 3 days, 1 daily x 3 days, 1/2 daily x 4 days.    roflumilast 500 mcg Tab Take 1 tablet (500 mcg total) by mouth once daily.    TRAZODONE HCL/DIET8 (TRAZAMINE ORAL) Take 100 mg by mouth every evening. Take 1.5 tablets     umeclidinium-vilanterol (ANORO ELLIPTA) 62.5-25 mcg/actuation DsDv Inhale 62.5 mcg into the lungs once daily. Controller    [DISCONTINUED] melatonin 10 mg Tab Take by mouth.     Family History     Problem Relation (Age of Onset)    Dementia Father    Diabetes Brother    Heart disease Father, Brother, Brother    Hypertension Father, Brother    Stroke Mother        Tobacco Use    Smoking status: Current Every Day Smoker     Packs/day: 2.00     Years: 49.00     Pack years: 98.00     Types: Cigarettes     Start date: 1969    Smokeless tobacco: Never Used    Tobacco comment: weaned down to 1/2 pack day since Jan 2018    Substance and Sexual Activity    Alcohol use: Yes     Alcohol/week: 3.0 oz     Types: 5 Cans of beer per week     Comment: 3 beers on weekend     Drug use: No    Sexual activity: Not on file     Review of Systems   Constitutional: Positive for appetite change, chills, diaphoresis, fatigue and fever.    HENT: Negative for congestion, rhinorrhea, sore throat and trouble swallowing.    Eyes: Negative for photophobia and visual disturbance.   Respiratory: Positive for cough (chronic nonproductive), shortness of breath and wheezing. Negative for chest tightness.    Cardiovascular: Negative for chest pain, palpitations and leg swelling.   Gastrointestinal: Negative for abdominal pain, diarrhea, nausea and vomiting.   Endocrine: Negative for polyphagia and polyuria.   Genitourinary: Negative for dysuria, flank pain and hematuria.   Musculoskeletal: Negative for back pain and neck pain.   Skin: Negative for rash.   Allergic/Immunologic: Negative for immunocompromised state.   Neurological: Positive for dizziness, weakness (Generalized) and headaches. Negative for seizures and syncope.   Hematological: Negative for adenopathy. Does not bruise/bleed easily.   Psychiatric/Behavioral: Positive for sleep disturbance. Negative for agitation, confusion, decreased concentration and hallucinations. The patient is not nervous/anxious.    All other systems reviewed and are negative.    Objective:     Vital Signs (Most Recent):  Temp: (!) 100.6 °F (38.1 °C) (08/31/18 2101)  Pulse: 101 (08/31/18 2250)  Resp: 18 (08/31/18 2250)  BP: 112/61 (08/31/18 2232)  SpO2: 96 % (08/31/18 2250) Vital Signs (24h Range):  Temp:  [100.6 °F (38.1 °C)-101.6 °F (38.7 °C)] 100.6 °F (38.1 °C)  Pulse:  [101-122] 101  Resp:  [18-26] 18  SpO2:  [94 %-100 %] 96 %  BP: ()/(52-64) 112/61     Weight: 89.1 kg (196 lb 6.9 oz)  Body mass index is 28.18 kg/m².    Physical Exam   Constitutional: He is oriented to person, place, and time. No distress.   Appears uncomfortable, wheezing mildly, in no respiratory distress.   HENT:   Head: Normocephalic and atraumatic.   Eyes: Conjunctivae and EOM are normal. Pupils are equal, round, and reactive to light. No scleral icterus.   Neck: Normal range of motion. Neck supple. No JVD present.   Cardiovascular: Normal  heart sounds and intact distal pulses. Tachycardia present.   No murmur heard.  Pulmonary/Chest: Effort normal. He has wheezes (Bilateral expiratory wheezes).   Abdominal: Soft. He exhibits no distension and no mass. There is no tenderness.   Musculoskeletal: Normal range of motion. He exhibits no edema or tenderness.   Lymphadenopathy:     He has no cervical adenopathy.   Neurological: He is alert and oriented to person, place, and time. No cranial nerve deficit. Coordination normal.   Skin: Skin is warm. Capillary refill takes 2 to 3 seconds. He is not diaphoretic. No erythema.   Psychiatric: His speech is normal and behavior is normal. Thought content normal. His mood appears anxious.   Nursing note and vitals reviewed.        CRANIAL NERVES     CN III, IV, VI   Pupils are equal, round, and reactive to light.  Extraocular motions are normal.        Significant Labs:   Bilirubin:   Recent Labs   Lab  08/31/18 1950   BILITOT  0.4     Blood Culture: No results for input(s): LABBLOO in the last 48 hours.  BMP:   Recent Labs   Lab  08/31/18 1950   GLU  112*   NA  131*   K  4.7   CL  97   CO2  23   BUN  26*   CREATININE  1.0   CALCIUM  9.0     CBC:   Recent Labs   Lab  08/31/18 1950   WBC  14.38*   HGB  10.9*   HCT  36.8*   PLT  189     CMP:   Recent Labs   Lab  08/31/18 1950   NA  131*   K  4.7   CL  97   CO2  23   GLU  112*   BUN  26*   CREATININE  1.0   CALCIUM  9.0   PROT  7.6   ALBUMIN  3.1*   BILITOT  0.4   ALKPHOS  106   AST  61*   ALT  37   ANIONGAP  11   EGFRNONAA  >60     Lactic Acid:   Recent Labs   Lab  08/31/18 1950   LACTATE  1.0     Respiratory Culture: No results for input(s): GSRESP, RESPIRATORYC in the last 48 hours.  Troponin:   Recent Labs   Lab  08/31/18 1950   TROPONINI  0.012     Urine Culture: No results for input(s): LABURIN in the last 48 hours.  Urine Studies:   Recent Labs   Lab  08/31/18 2126   COLORU  Yellow   APPEARANCEUA  Clear   PHUR  6.0   SPECGRAV  1.015   PROTEINUA   Negative   GLUCUA  Negative   KETONESU  Negative   BILIRUBINUA  Negative   OCCULTUA  Negative   NITRITE  Negative   UROBILINOGEN  Negative   LEUKOCYTESUR  Negative     All pertinent labs within the past 24 hours have been reviewed.    Significant Imaging: I have reviewed and interpreted all pertinent imaging results/findings within the past 24 hours.     Imaging Results          X-Ray Chest AP Portable (Final result)  Result time 08/31/18 20:22:18    Final result by Angel Cantrell MD (08/31/18 20:22:18)                 Impression:      Questionable increased density medial left upper lobe near the apex of this may be summation shadow.  Short term follow up repeat PA chest and apical lordotic view recommended as lung nodule not excluded.      Electronically signed by: Angel Cantrell MD  Date:    08/31/2018  Time:    20:22             Narrative:    EXAMINATION:  XR CHEST AP PORTABLE    CLINICAL HISTORY:  Sepsis;    TECHNIQUE:  Single frontal view of the chest was performed.    COMPARISON:  Five hundred eighteen    FINDINGS:  The cardiomediastinal silhouette is normal.    The lungs are clear.    No effusions.                                  I have independently reviewed and interpreted the EKG.     I have independently reviewed all pertinent labs within the past 24 hours.    I have independently reviewed, visualized and interpreted all pertinent imaging results within the past 24 hours and discussed the findings with the ED physician, Dr. Jordan.            Assessment/Plan:     * Sepsis    Temperature 101.6°, heart rate 118, WBC 62783, lactic acid 1.0.  Source left upper lobe pneumonia.  Patient received IV vancomycin, IV Zosyn in the ED.  Continue with IV Rocephin and IV Zithromax daily.  Follow up on blood and sputum cultures.  Patient received normal saline 30 mL/kilogram bolus in the ED.          Pneumonia due to infectious organism    Chest x-ray reveals left upper lobe infiltrate.  Will follow up on CT chest with  contrast for better evaluation (patient is a chronic tobacco use, rule out mass).  Bronchodilators every 6 hr scheduled.  Continue home dose long-acting bronchodilators.  Follow up on cultures.            COPD exacerbation    Patient has expiratory wheezing.  Chronic tobacco user.  Solu-Medrol 80 mg IV q.8.  Continue bronchodilators.          Essential hypertension    Blood pressure on the low side in the setting of sepsis (systolic low 100s).    Will hold ACE inhibitor and diuretics.  Continue beta-blocker.  Monitor blood pressure closely and adjust medications as needed.          Tobacco dependence due to cigarettes    Chronic tobacco use, patient reports that he has significantly cut back.  Continue with nicotine patch.            VTE Risk Mitigation (From admission, onward)        Ordered     enoxaparin injection 40 mg  Daily      08/31/18 2322     Place sequential compression device  Until discontinued      08/31/18 2322             Paco Olmos MD  Department of Hospital Medicine   [facility]

## 2018-09-01 NOTE — ASSESSMENT & PLAN NOTE
Chest x-ray reveals left upper lobe infiltrate.  Will follow up on CT chest with contrast for better evaluation (patient is a chronic tobacco use, rule out mass).  Bronchodilators every 6 hr scheduled.  Continue home dose long-acting bronchodilators.  Follow up on cultures.

## 2018-09-02 VITALS
TEMPERATURE: 96 F | DIASTOLIC BLOOD PRESSURE: 66 MMHG | RESPIRATION RATE: 18 BRPM | HEIGHT: 70 IN | OXYGEN SATURATION: 95 % | SYSTOLIC BLOOD PRESSURE: 144 MMHG | HEART RATE: 78 BPM | WEIGHT: 200.38 LBS | BODY MASS INDEX: 28.69 KG/M2

## 2018-09-02 LAB
ALBUMIN SERPL BCP-MCNC: 2.8 G/DL
ALP SERPL-CCNC: 80 U/L
ALT SERPL W/O P-5'-P-CCNC: 31 U/L
ANION GAP SERPL CALC-SCNC: 10 MMOL/L
ANISOCYTOSIS BLD QL SMEAR: SLIGHT
AST SERPL-CCNC: 39 U/L
BASOPHILS # BLD AUTO: 0.03 K/UL
BASOPHILS NFR BLD: 0.2 %
BILIRUB SERPL-MCNC: 0.2 MG/DL
BUN SERPL-MCNC: 15 MG/DL
CALCIUM SERPL-MCNC: 8.3 MG/DL
CHLORIDE SERPL-SCNC: 106 MMOL/L
CO2 SERPL-SCNC: 21 MMOL/L
CREAT SERPL-MCNC: 0.8 MG/DL
DACRYOCYTES BLD QL SMEAR: ABNORMAL
DIFFERENTIAL METHOD: ABNORMAL
EOSINOPHIL # BLD AUTO: 0 K/UL
EOSINOPHIL NFR BLD: 0 %
ERYTHROCYTE [DISTWIDTH] IN BLOOD BY AUTOMATED COUNT: 23.1 %
EST. GFR  (AFRICAN AMERICAN): >60 ML/MIN/1.73 M^2
EST. GFR  (NON AFRICAN AMERICAN): >60 ML/MIN/1.73 M^2
GIANT PLATELETS BLD QL SMEAR: PRESENT
GLUCOSE SERPL-MCNC: 195 MG/DL
HCT VFR BLD AUTO: 32.3 %
HGB BLD-MCNC: 9 G/DL
HYPOCHROMIA BLD QL SMEAR: ABNORMAL
LYMPHOCYTES # BLD AUTO: 3.9 K/UL
LYMPHOCYTES NFR BLD: 31.6 %
MCH RBC QN AUTO: 24.7 PG
MCHC RBC AUTO-ENTMCNC: 27.9 G/DL
MCV RBC AUTO: 89 FL
MONOCYTES # BLD AUTO: 0.3 K/UL
MONOCYTES NFR BLD: 2.3 %
NEUTROPHILS # BLD AUTO: 8.2 K/UL
NEUTROPHILS NFR BLD: 66.2 %
OVALOCYTES BLD QL SMEAR: ABNORMAL
PLATELET # BLD AUTO: 216 K/UL
PLATELET BLD QL SMEAR: ABNORMAL
PMV BLD AUTO: 10.6 FL
POIKILOCYTOSIS BLD QL SMEAR: SLIGHT
POLYCHROMASIA BLD QL SMEAR: ABNORMAL
POTASSIUM SERPL-SCNC: 3.9 MMOL/L
PROT SERPL-MCNC: 6.7 G/DL
RBC # BLD AUTO: 3.65 M/UL
SODIUM SERPL-SCNC: 137 MMOL/L
SPHEROCYTES BLD QL SMEAR: ABNORMAL
STOMATOCYTES BLD QL SMEAR: PRESENT
TARGETS BLD QL SMEAR: ABNORMAL
WBC # BLD AUTO: 12.41 K/UL

## 2018-09-02 PROCEDURE — 25000242 PHARM REV CODE 250 ALT 637 W/ HCPCS: Performed by: NURSE PRACTITIONER

## 2018-09-02 PROCEDURE — 96376 TX/PRO/DX INJ SAME DRUG ADON: CPT

## 2018-09-02 PROCEDURE — 94640 AIRWAY INHALATION TREATMENT: CPT

## 2018-09-02 PROCEDURE — G0378 HOSPITAL OBSERVATION PER HR: HCPCS

## 2018-09-02 PROCEDURE — 94761 N-INVAS EAR/PLS OXIMETRY MLT: CPT

## 2018-09-02 PROCEDURE — 85025 COMPLETE CBC W/AUTO DIFF WBC: CPT

## 2018-09-02 PROCEDURE — 63600175 PHARM REV CODE 636 W HCPCS: Performed by: INTERNAL MEDICINE

## 2018-09-02 PROCEDURE — 25000003 PHARM REV CODE 250: Performed by: INTERNAL MEDICINE

## 2018-09-02 PROCEDURE — 94760 N-INVAS EAR/PLS OXIMETRY 1: CPT

## 2018-09-02 PROCEDURE — 25000242 PHARM REV CODE 250 ALT 637 W/ HCPCS: Performed by: INTERNAL MEDICINE

## 2018-09-02 PROCEDURE — 27000221 HC OXYGEN, UP TO 24 HOURS

## 2018-09-02 PROCEDURE — S4991 NICOTINE PATCH NONLEGEND: HCPCS | Performed by: INTERNAL MEDICINE

## 2018-09-02 PROCEDURE — 80053 COMPREHEN METABOLIC PANEL: CPT

## 2018-09-02 PROCEDURE — 36415 COLL VENOUS BLD VENIPUNCTURE: CPT

## 2018-09-02 RX ORDER — PREDNISONE 20 MG/1
TABLET ORAL
Qty: 20 TABLET | Refills: 0 | Status: SHIPPED | OUTPATIENT
Start: 2018-09-02 | End: 2019-02-07

## 2018-09-02 RX ORDER — PREDNISONE 20 MG/1
TABLET ORAL
Qty: 20 TABLET | Refills: 0 | Status: SHIPPED | OUTPATIENT
Start: 2018-09-02 | End: 2018-09-02 | Stop reason: SDUPTHER

## 2018-09-02 RX ORDER — ZOLPIDEM TARTRATE 5 MG/1
5 TABLET ORAL NIGHTLY PRN
Qty: 20 TABLET | Refills: 0 | Status: SHIPPED | OUTPATIENT
Start: 2018-09-02 | End: 2019-02-07

## 2018-09-02 RX ADMIN — ARFORMOTEROL TARTRATE 15 MCG: 15 SOLUTION RESPIRATORY (INHALATION) at 08:09

## 2018-09-02 RX ADMIN — IPRATROPIUM BROMIDE AND ALBUTEROL SULFATE 3 ML: .5; 3 SOLUTION RESPIRATORY (INHALATION) at 12:09

## 2018-09-02 RX ADMIN — CLOPIDOGREL BISULFATE 75 MG: 75 TABLET, FILM COATED ORAL at 08:09

## 2018-09-02 RX ADMIN — BUDESONIDE 0.25 MG: 0.25 SUSPENSION RESPIRATORY (INHALATION) at 08:09

## 2018-09-02 RX ADMIN — CARVEDILOL 25 MG: 12.5 TABLET, FILM COATED ORAL at 08:09

## 2018-09-02 RX ADMIN — PANTOPRAZOLE SODIUM 40 MG: 40 TABLET, DELAYED RELEASE ORAL at 08:09

## 2018-09-02 RX ADMIN — NICOTINE 1 PATCH: 21 PATCH, EXTENDED RELEASE TRANSDERMAL at 08:09

## 2018-09-02 RX ADMIN — METHYLPREDNISOLONE SODIUM SUCCINATE 80 MG: 125 INJECTION, POWDER, FOR SOLUTION INTRAMUSCULAR; INTRAVENOUS at 06:09

## 2018-09-02 RX ADMIN — IPRATROPIUM BROMIDE AND ALBUTEROL SULFATE 3 ML: .5; 3 SOLUTION RESPIRATORY (INHALATION) at 08:09

## 2018-09-02 RX ADMIN — CELECOXIB 100 MG: 100 CAPSULE ORAL at 09:09

## 2018-09-02 RX ADMIN — AZITHROMYCIN MONOHYDRATE 500 MG: 500 INJECTION, POWDER, LYOPHILIZED, FOR SOLUTION INTRAVENOUS at 12:09

## 2018-09-02 NOTE — NURSING
Patient discharged per physician's orders. IV access was discontinued with no harm caused to patient. Patient appears to be in no apparent distress. Discharge paperwork provided, enlisting details regarding his hospital visit summary.

## 2018-09-02 NOTE — DISCHARGE SUMMARY
[facility]  Hospital Medicine  Discharge Summary      Patient Name: Filemon Aranda  MRN: [id number]  Admission Date: 8/31/2018  Hospital Length of Stay: 2 days  Discharge Date and Time:  09/02/2018 12:34 PM  Attending Physician: Luca Brooks MD   Discharging Provider: Eliezer Wells NP  Primary Care Provider: Jacob Ferreira MD      HPI:   Mr. Aranda is a 62-year-old  male with history of COPD, tobacco use, HTN, carotid artery disease with stent placement, on Plavix, presents to the ED complaining of worsening shortness of breath associated with fever, chills for the past one week.  He also describes exertional dyspnea, chronic nonproductive cough along with headache.  Reports fever of 101.4 at home.  Temperature here 101.6, heart rate 118.  WBC 14,000, lactic acid 1.0.  Chest x-ray reveals mild left upper lobe medial infiltrate.  Patient received normal saline 30 mL/kilogram bolus in the ED along with the IV vancomycin x1 and IV Zosyn x1.  Blood cultures have been obtained.  Patient also has been wheezing, received prednisone 60 mg in the ED. Patient admitted with a diagnosis of sepsis secondary to pneumonia.    * No surgery found *      Hospital Course:   Mr Aranda is a 62 year old male with COPD, HTN, tobacco abuse, carotid artery disease with stent placement on Plavix who presented to [facility] Emergency Room with increase shortness of breath. Associated symptoms include fever and coughing. Chest X ray showed questionable increased density medial left upper lobe near the apex of this may be summation shadow. CT of chest with contrast showed no acute findings and lungs are clear, pneumonia ruled out. Lactic Acid normal. Patient was started on nebs, IV steroids and IV antibiotics on yesterday. He is feeling much better today. Denies chest pain or increase shortness of breath. Patient has been ambulation up and down halls without difficult. O 2 saturations stable on room air. Will  discharge patient of steroid taper, he will follow up with Pulmonary Clinic as outpatient. He was seen, examined and deemed suitable for discharge.      Consults:     No new Assessment & Plan notes have been filed under this hospital service since the last note was generated.  Service: Hospital Medicine    Final Active Diagnoses:    Diagnosis Date Noted POA    PRINCIPAL PROBLEM:  Sepsis [A41.9] 08/31/2018 Yes    COPD exacerbation [J44.1] 08/05/2017 Yes    Pneumonia due to infectious organism [J18.9] 08/31/2018 Yes    Essential hypertension [I10] 08/05/2017 Yes    Tobacco dependence due to cigarettes [F17.210] 08/05/2017 Yes     Chronic    Hyperlipidemia [E78.5] 08/05/2017 Yes      Problems Resolved During this Admission:       Discharged Condition: stable    Disposition: Home or Self Care    Follow Up:  Follow-up Information     Nica Clay NP. Schedule an appointment as soon as possible for a visit in 2 days.    Specialties:  Pulmonary Disease, Internal Medicine  Why:  hospital follow up   Contact information:  [address] [place] [address] [facility] [address] [phone]                 Patient Instructions:      Ambulatory Referral to Pulmonology   Referral Priority: Routine Referral Type: Consultation   Referral Reason: Specialty Services Required   Requested Specialty: Pulmonary Disease   Number of Visits Requested: 1     Diet Cardiac     Notify your health care provider if you experience any of the following:  difficulty breathing or increased cough     Activity as tolerated       Significant Diagnostic Studies: Labs:   CMP   Recent Labs   Lab  08/31/18   1950  09/01/18   0612  09/02/18   0431   NA  131*  134*  137   K  4.7  4.2  3.9   CL  97  104  106   CO2  23  23  21*   GLU  112*  147*  195*   BUN  26*  18  15   CREATININE  1.0  0.8  0.8   CALCIUM  9.0  8.3*  8.3*   PROT  7.6  6.4  6.7   ALBUMIN  3.1*  2.7*  2.8*   BILITOT  0.4  0.3  0.2   ALKPHOS  106  84  80   AST  61*  44*  39   ALT  37  30  31    ANIONGAP  11  7*  10   ESTGFRAFRICA  >60  >60  >60   EGFRNONAA  >60  >60  >60    and CBC   Recent Labs   Lab  08/31/18   1950  09/01/18   0612  09/02/18   0431   WBC  14.38*  7.71  12.41   HGB  10.9*  9.2*  9.0*   HCT  36.8*  32.2*  32.3*   PLT  189  165  216       Pending Diagnostic Studies:     None         Medications:  Reconciled Home Medications:      Medication List      START taking these medications    predniSONE 20 MG tablet  Commonly known as:  DELTASONE  3 daily x 3 days, 2 daily x 3 days, 1 daily x 3 days, 1/2 daily x 4 days.     zolpidem 5 MG Tab  Commonly known as:  AMBIEN  Take 1 tablet (5 mg total) by mouth nightly as needed.        CONTINUE taking these medications    albuterol 2.5 mg /3 mL (0.083 %) nebulizer solution  Commonly known as:  PROVENTIL  Take 2.5 mg by nebulization every 8 (eight) hours as needed for Wheezing. Rescue     ANORO ELLIPTA 62.5-25 mcg/actuation Dsdv  Generic drug:  umeclidinium-vilanterol  Inhale 62.5 mcg into the lungs once daily. Controller     b complex vitamins tablet  Take 1 tablet by mouth once daily.     carvedilol 25 MG tablet  Commonly known as:  COREG  Take 25 mg by mouth 2 (two) times daily with meals.     clopidogrel 75 mg tablet  Commonly known as:  PLAVIX  Take 75 mg by mouth once daily.     ipratropium-albuterol  mcg/actuation inhaler  Commonly known as:  COMBIVENT  Inhale 1 puff into the lungs every 6 (six) hours as needed for Wheezing. Rescue     lisinopril-hydrochlorothiazide 20-25 mg Tab  Commonly known as:  PRINZIDE,ZESTORETIC  Take 1 tablet by mouth once daily.     lovastatin 40 MG tablet  Commonly known as:  MEVACOR  Take 40 mg by mouth every evening.     pantoprazole 40 MG tablet  Commonly known as:  PROTONIX  Take 40 mg by mouth once daily.     roflumilast 500 mcg Tab  Take 1 tablet (500 mcg total) by mouth once daily.        STOP taking these medications    melatonin 10 mg Tab     TRAZAMINE ORAL            Indwelling Lines/Drains at time of  discharge:   Lines/Drains/Airways          None          Time spent on the discharge of patient: 45 minutes  Patient was seen and examined on the date of discharge and determined to be suitable for discharge.         Eliezer Wells NP  Department of Hospital Medicine  [facility]

## 2018-09-04 ENCOUNTER — PATIENT OUTREACH (OUTPATIENT)
Dept: ADMINISTRATIVE | Facility: CLINIC | Age: 63
End: 2018-09-04

## 2018-09-04 ENCOUNTER — NURSE TRIAGE (OUTPATIENT)
Dept: ADMINISTRATIVE | Facility: CLINIC | Age: 63
End: 2018-09-04

## 2018-09-04 NOTE — PATIENT INSTRUCTIONS
Understanding Sepsis  Sepsis is a severe response the body has to an infection. It is most often caused by bacteria. It is also known as septicemia, or systemic inflammatory response syndrome (SIRS). Sepsis is a medical emergency. It needs to be treated right away.  What is sepsis?  Sepsis is when the body reacts to an infection with a severe inflammatory response. It can be caused by bacteria, fungus, or a virus. Sepsis can cause many kinds of problems around the body. It can lead severe low blood pressure (shock) and organ failure. This can lead to death if not treated.  Sepsis is most common in:  · Infants and older adults  · People with an infection such as pneumonia, meningitis, or a urinary tract infection  · People who have an illness such as cancer, AIDS, or diabetes  · People being treated with chemotherapy medications or radiation  · People who have had a transplant  Symptoms of sepsis  Symptoms of sepsis can include:  · Chills and shaking  · Rapid heartbeat  · Rapid breathing  · Shortness of breath  · Severe nausea or uncontrolled vomiting  · Confusion  · Dizziness  · Decreased urination  · Severe pain, including in the back or joints   Diagnosing sepsis  If your health care provider thinks you may have sepsis, you will be given tests. You may have blood and urine tests. These are done to look for bacteria, viruses, or fungus. You may also have X-rays or other imaging tests. These may be done to look at your organs to locate the source of infection.  Treating sepsis  If you have sepsis, your health care provider will give you antibiotics through a thin, flexible tube put into a vein in your arm (IV). You will also be given fluids through the IV. You may also be given nutrition or other medications through your IV. Your health care provider will talk with you about other treatments you may need. These may include using an oxygen mask or a ventilator to help with breathing. Treatment may last at least 7  to 10 days. Sepsis must be treated in the hospital.  Date Last Reviewed: 7/15/2015  © 2804-9726 [employer] [address] [place] [address], [place] [address]. All rights reserved. This information is not intended as a substitute for professional medical care. Always follow your healthcare professional's instructions.

## 2018-09-04 NOTE — TELEPHONE ENCOUNTER
Reason for Disposition   [1] MODERATE weakness (i.e., interferes with work, school, normal activities) AND [2] persists > 3 days    Protocols used: ST WEAKNESS (GENERALIZED) AND FATIGUE-A-    Patient is a recent discharge from [facility] for [place].  He stated that he is very weak, still in bed, but able to walk to bathroom and kitchen. He is drinking plenty of fluids, but not eating very much. He stated he is having sweats and possible fever, but no thermometer to check this.  Per protocol, he was instructed to see his PCP within 24 hours.  Patient stated he would go tomorrow to be seen.

## 2018-09-06 LAB
BACTERIA BLD CULT: NORMAL
BACTERIA BLD CULT: NORMAL

## 2019-01-12 ENCOUNTER — HOSPITAL ENCOUNTER (EMERGENCY)
Facility: HOSPITAL | Age: 64
Discharge: HOME | End: 2019-01-12
Attending: INTERNAL MEDICINE
Payer: MEDICAID

## 2019-01-12 VITALS
BODY MASS INDEX: 30.22 KG/M2 | OXYGEN SATURATION: 95 % | TEMPERATURE: 99 F | SYSTOLIC BLOOD PRESSURE: 189 MMHG | WEIGHT: 211.06 LBS | HEART RATE: 100 BPM | DIASTOLIC BLOOD PRESSURE: 92 MMHG | RESPIRATION RATE: 20 BRPM | HEIGHT: 70 IN

## 2019-01-12 DIAGNOSIS — I10 ESSENTIAL HYPERTENSION: ICD-10-CM

## 2019-01-12 DIAGNOSIS — R22.0 FACIAL SWELLING: ICD-10-CM

## 2019-01-12 DIAGNOSIS — R06.02 SOB (SHORTNESS OF BREATH): ICD-10-CM

## 2019-01-12 DIAGNOSIS — K04.7 DENTAL ABSCESS: ICD-10-CM

## 2019-01-12 DIAGNOSIS — F17.210 TOBACCO DEPENDENCE DUE TO CIGARETTES: Chronic | ICD-10-CM

## 2019-01-12 DIAGNOSIS — J44.1 COPD EXACERBATION: ICD-10-CM

## 2019-01-12 DIAGNOSIS — J40 BRONCHITIS: ICD-10-CM

## 2019-01-12 DIAGNOSIS — H70.91 MASTOIDITIS OF RIGHT SIDE: ICD-10-CM

## 2019-01-12 DIAGNOSIS — K05.30 PERIODONTITIS: ICD-10-CM

## 2019-01-12 DIAGNOSIS — R09.02 HYPOXEMIA: ICD-10-CM

## 2019-01-12 DIAGNOSIS — J01.01 ACUTE RECURRENT MAXILLARY SINUSITIS: Primary | ICD-10-CM

## 2019-01-12 LAB
ALBUMIN SERPL BCP-MCNC: 3.2 G/DL
ALLENS TEST: ABNORMAL
ALP SERPL-CCNC: 75 U/L
ALT SERPL W/O P-5'-P-CCNC: 10 U/L
ANION GAP SERPL CALC-SCNC: 7 MMOL/L
AST SERPL-CCNC: 23 U/L
BASOPHILS # BLD AUTO: 0.05 K/UL
BASOPHILS NFR BLD: 0.6 %
BILIRUB SERPL-MCNC: 0.4 MG/DL
BILIRUB UR QL STRIP: NEGATIVE
BNP SERPL-MCNC: 350 PG/ML
BUN SERPL-MCNC: 13 MG/DL
CALCIUM SERPL-MCNC: 8.9 MG/DL
CHLORIDE SERPL-SCNC: 103 MMOL/L
CLARITY UR: CLEAR
CO2 SERPL-SCNC: 30 MMOL/L
COLOR UR: YELLOW
CREAT SERPL-MCNC: 0.8 MG/DL
DELSYS: ABNORMAL
DIFFERENTIAL METHOD: ABNORMAL
EOSINOPHIL # BLD AUTO: 0.2 K/UL
EOSINOPHIL NFR BLD: 1.7 %
ERYTHROCYTE [DISTWIDTH] IN BLOOD BY AUTOMATED COUNT: 20.3 %
EST. GFR  (AFRICAN AMERICAN): >60 ML/MIN/1.73 M^2
EST. GFR  (NON AFRICAN AMERICAN): >60 ML/MIN/1.73 M^2
FIO2: 21
GLUCOSE SERPL-MCNC: 85 MG/DL
GLUCOSE UR QL STRIP: NEGATIVE
HCO3 UR-SCNC: 28.1 MMOL/L (ref 24–28)
HCT VFR BLD AUTO: 32.1 %
HGB BLD-MCNC: 8.8 G/DL
HGB UR QL STRIP: NEGATIVE
KETONES UR QL STRIP: NEGATIVE
LACTATE SERPL-SCNC: 0.8 MMOL/L
LEUKOCYTE ESTERASE UR QL STRIP: NEGATIVE
LYMPHOCYTES # BLD AUTO: 2.7 K/UL
LYMPHOCYTES NFR BLD: 30.6 %
MCH RBC QN AUTO: 23 PG
MCHC RBC AUTO-ENTMCNC: 27.4 G/DL
MCV RBC AUTO: 84 FL
MODE: ABNORMAL
MONOCYTES # BLD AUTO: 1.3 K/UL
MONOCYTES NFR BLD: 14 %
NEUTROPHILS # BLD AUTO: 4.8 K/UL
NEUTROPHILS NFR BLD: 53.4 %
NITRITE UR QL STRIP: NEGATIVE
PCO2 BLDA: 47.9 MMHG (ref 35–45)
PH SMN: 7.38 [PH] (ref 7.35–7.45)
PH UR STRIP: 8 [PH] (ref 5–8)
PLATELET # BLD AUTO: 188 K/UL
PMV BLD AUTO: 9.7 FL
PO2 BLDA: 53 MMHG (ref 80–100)
POC BE: 3 MMOL/L
POC SATURATED O2: 86 % (ref 95–100)
POTASSIUM SERPL-SCNC: 4.4 MMOL/L
PROCALCITONIN SERPL IA-MCNC: 0.04 NG/ML
PROT SERPL-MCNC: 6.7 G/DL
PROT UR QL STRIP: NEGATIVE
RBC # BLD AUTO: 3.82 M/UL
SAMPLE: ABNORMAL
SITE: ABNORMAL
SODIUM SERPL-SCNC: 140 MMOL/L
SP GR UR STRIP: 1.01 (ref 1–1.03)
TROPONIN I SERPL DL<=0.01 NG/ML-MCNC: <0.006 NG/ML
URN SPEC COLLECT METH UR: NORMAL
UROBILINOGEN UR STRIP-ACNC: NEGATIVE EU/DL
WBC # BLD AUTO: 8.96 K/UL

## 2019-01-12 PROCEDURE — 81003 URINALYSIS AUTO W/O SCOPE: CPT

## 2019-01-12 PROCEDURE — 96361 HYDRATE IV INFUSION ADD-ON: CPT

## 2019-01-12 PROCEDURE — 87040 BLOOD CULTURE FOR BACTERIA: CPT | Mod: 59

## 2019-01-12 PROCEDURE — 82803 BLOOD GASES ANY COMBINATION: CPT

## 2019-01-12 PROCEDURE — 36600 WITHDRAWAL OF ARTERIAL BLOOD: CPT

## 2019-01-12 PROCEDURE — 83605 ASSAY OF LACTIC ACID: CPT

## 2019-01-12 PROCEDURE — 85025 COMPLETE CBC W/AUTO DIFF WBC: CPT

## 2019-01-12 PROCEDURE — 27000221 HC OXYGEN, UP TO 24 HOURS

## 2019-01-12 PROCEDURE — 84145 PROCALCITONIN (PCT): CPT

## 2019-01-12 PROCEDURE — 80053 COMPREHEN METABOLIC PANEL: CPT

## 2019-01-12 PROCEDURE — 96374 THER/PROPH/DIAG INJ IV PUSH: CPT

## 2019-01-12 PROCEDURE — 99900035 HC TECH TIME PER 15 MIN (STAT)

## 2019-01-12 PROCEDURE — 94640 AIRWAY INHALATION TREATMENT: CPT

## 2019-01-12 PROCEDURE — 63600175 PHARM REV CODE 636 W HCPCS: Performed by: INTERNAL MEDICINE

## 2019-01-12 PROCEDURE — 25000242 PHARM REV CODE 250 ALT 637 W/ HCPCS: Performed by: INTERNAL MEDICINE

## 2019-01-12 PROCEDURE — 96375 TX/PRO/DX INJ NEW DRUG ADDON: CPT

## 2019-01-12 PROCEDURE — 93010 EKG 12-LEAD: ICD-10-PCS | Mod: ,,, | Performed by: INTERNAL MEDICINE

## 2019-01-12 PROCEDURE — 93010 ELECTROCARDIOGRAM REPORT: CPT | Mod: ,,, | Performed by: INTERNAL MEDICINE

## 2019-01-12 PROCEDURE — 99285 EMERGENCY DEPT VISIT HI MDM: CPT | Mod: 25

## 2019-01-12 PROCEDURE — 84484 ASSAY OF TROPONIN QUANT: CPT

## 2019-01-12 PROCEDURE — 83880 ASSAY OF NATRIURETIC PEPTIDE: CPT

## 2019-01-12 PROCEDURE — 25000003 PHARM REV CODE 250: Performed by: INTERNAL MEDICINE

## 2019-01-12 RX ORDER — OXYCODONE AND ACETAMINOPHEN 5; 325 MG/1; MG/1
1 TABLET ORAL EVERY 4 HOURS PRN
Qty: 30 TABLET | Refills: 0 | Status: SHIPPED | OUTPATIENT
Start: 2019-01-12 | End: 2019-02-07

## 2019-01-12 RX ORDER — METHYLPREDNISOLONE SOD SUCC 125 MG
125 VIAL (EA) INJECTION
Status: COMPLETED | OUTPATIENT
Start: 2019-01-12 | End: 2019-01-12

## 2019-01-12 RX ORDER — IPRATROPIUM BROMIDE AND ALBUTEROL SULFATE 2.5; .5 MG/3ML; MG/3ML
3 SOLUTION RESPIRATORY (INHALATION)
Status: COMPLETED | OUTPATIENT
Start: 2019-01-12 | End: 2019-01-12

## 2019-01-12 RX ORDER — KETOROLAC TROMETHAMINE 30 MG/ML
30 INJECTION, SOLUTION INTRAMUSCULAR; INTRAVENOUS
Status: COMPLETED | OUTPATIENT
Start: 2019-01-12 | End: 2019-01-12

## 2019-01-12 RX ADMIN — KETOROLAC TROMETHAMINE 30 MG: 30 INJECTION INTRAMUSCULAR; INTRAVENOUS at 09:01

## 2019-01-12 RX ADMIN — IPRATROPIUM BROMIDE AND ALBUTEROL SULFATE 3 ML: .5; 3 SOLUTION RESPIRATORY (INHALATION) at 09:01

## 2019-01-12 RX ADMIN — SODIUM CHLORIDE 1000 ML: 0.9 INJECTION, SOLUTION INTRAVENOUS at 09:01

## 2019-01-12 RX ADMIN — METHYLPREDNISOLONE SODIUM SUCCINATE 125 MG: 125 INJECTION, POWDER, FOR SOLUTION INTRAMUSCULAR; INTRAVENOUS at 09:01

## 2019-01-12 NOTE — ED NOTES
Pt c/o facial pain and swelling with some weakness x 2-3 weeks ago.    Patient moved to ED room 2, patient assisted onto stretcher and changed into a gown. Patient placed on cardiac monitor, continuous pulse oximetry and automatic blood pressure cuff. Bed placed in low locked position, side rails up x 2, call light is within reach of patient or family, orientation to room and explanation of wait provided to family and patient, alarms set and turned on for monitor and pulse ox, awaiting MD evaluation and orders, will continue to monitor.    Patient identifies self as Filemon Aranda      LOC: The patient is awake, alert and aware of environment with an appropriate affect, the patient is oriented x 3 and speaking appropriately.  APPEARANCE: Patient resting comfortably and in no acute distress, patient is clean and well groomed, patient's clothing is properly fastened. Swelling noted to upper right cheek and flushing. Patient is grimacing in pain and guards upper right face when talking about his pain.  SKIN: The skin is warm and dry, color consistent with ethnicity, patient has normal skin turgor and moist mucus membranes, skin intact, no breakdown or bruising noted.  MUSCULOSKELETAL: Patient moving all extremities well, no obvious swelling or deformities noted.  RESPIRATORY: Airway is open and patent, respirations are spontaneous, patient has a normal effort and rate, no accessory muscle use noted.  CARDIAC: Patient has a normal rate and rhythm, no periphreal edema noted, capillary refill < 3 seconds.  ABDOMEN: Soft and non tender to palpation, no distention noted.  NEUROLOGIC: PERRL, eyes open spontaneously, behavior appropriate to situation, follows commands, facial expression symmetrical, bilateral hand grasp equal and even, purposeful motor response noted, normal sensation in all extremities when touched with a finger.

## 2019-01-12 NOTE — DISCHARGE INSTRUCTIONS
Drink plenty of fluids to make urine look like water     Come back to ER if cannot keep fluid down or with Nausea vomiting or diarrhea or any of the current symptoms get worse.  Take clindamycin for your multiple infections.  Follow-up with lung specialist for your severe lung problems

## 2019-01-12 NOTE — ED PROVIDER NOTES
SCRIBE #1 NOTE: I, Ana Richards, am scribing for, and in the presence of, Isael Roberts MD. I have scribed the entire note.       History     Chief Complaint   Patient presents with    Facial Swelling     reports increased facial swelling for the last several days, saw PCP and was placed on clindamycin 150 mg      Review of patient's allergies indicates:  No Known Allergies      History of Present Illness     HPI    1/12/2019, 8:55 AM  History obtained from the patient      History of Present Illness: Filemon Aranda is a 63 y.o. male patient with a PMHx including COPD, HTN, and nephrolithiasis who presents to the Emergency Department for evaluation of facial swelling and pain which onset gradually yesterday. Symptoms are constant and moderate in severity. Pt was seen by his PCP yesterday for oral swelling and was Rx clindamycin. Pt reports he took 2 clindamycin before bed last night and takes 12 BC powder a day for pain. Pt reports dental pain and has not seen a dentist for teeth extraction. No mitigating or exacerbating factors reported. Associated sxs include HA and weight gain. Patient denies any fever, chills, cough, congestion, n/v/d, dizziness, light headedness, and all other sxs at this time. Pt was seen at [facility] on 04/17 for similar sxs. No further complaints or concerns at this time.         Arrival mode: Personal vehicle     PCP: Jacob Ferreira MD        Past Medical History:  Past Medical History:   Diagnosis Date    COPD (chronic obstructive pulmonary disease)     Hypercholesteremia     Hypertension     Insomnia     Renal disorder     kidney stones       Past Surgical History:  Past Surgical History:   Procedure Laterality Date    CAROTID ENDARTERECTOMY      CAROTID STENT      KIDNEY STONE SURGERY           Family History:  Family History   Problem Relation Age of Onset    Stroke Mother     Heart disease Father     Hypertension Father     Dementia Father     Heart disease Brother      Diabetes Brother     Heart disease Brother     Hypertension Brother        Social History:  Social History     Tobacco Use    Smoking status: Current Every Day Smoker     Packs/day: 2.00     Years: 49.00     Pack years: 98.00     Types: Cigarettes     Start date: 1969    Smokeless tobacco: Never Used    Tobacco comment: weaned down to 1/2 pack day since Jan 2018    Substance and Sexual Activity    Alcohol use: Yes     Alcohol/week: 3.0 oz     Types: 5 Cans of beer per week     Comment: 3 beers on weekend     Drug use: No    Sexual activity: Unknown        Review of Systems     Review of Systems   Constitutional: Positive for unexpected weight change (increased weight). Negative for chills and fever.   HENT: Positive for dental problem and facial swelling. Negative for congestion, ear discharge, ear pain, sore throat and voice change.         (+) facial pain   Respiratory: Negative for cough and shortness of breath.    Cardiovascular: Negative for chest pain and leg swelling.   Gastrointestinal: Negative for abdominal pain, diarrhea, nausea and vomiting.   Genitourinary: Negative for dysuria, flank pain and hematuria.   Musculoskeletal: Negative for back pain and myalgias.   Skin: Negative for rash and wound.   Neurological: Positive for headaches. Negative for dizziness and light-headedness.   Psychiatric/Behavioral: Negative for agitation and confusion.   All other systems reviewed and are negative.       Physical Exam     Initial Vitals [01/12/19 0849]   BP Pulse Resp Temp SpO2   (!) 181/100 106 20 98.4 °F (36.9 °C) (!) 90 %      MAP       --          Physical Exam  Nursing Notes and Vital Signs Reviewed.  Constitutional: Patient is in no apparent distress. Well-developed and well-nourished.  Head: Atraumatic. Normocephalic.  Eyes: PERRL. EOM intact. Conjunctivae are not pale. No scleral icterus.  ENT: Mucous membranes are moist. Oropharynx is clear and symmetric. Fluid in mid R ear. L ear is  "normal.  Mouth/Throat: Evident facial swelling. Patient handles secretions normally.  Positive for dental caries. Positive for periodontitis. No palpable fluctuance. No trismus. Lower L and R molar tenderness.  Neck: Supple. Full ROM. No lymphadenopathy.  Cardiovascular: Regular rate. Regular rhythm. No murmurs, rubs, or gallops. Distal pulses are 2+ and symmetric.  Pulmonary/Chest: No respiratory distress. Severe SOB and severe bilateral wheezing.   Abdominal: Soft and non-distended.  There is no tenderness.  No rebound, guarding, or rigidity. Good bowel sounds.  Genitourinary: No CVA tenderness  Musculoskeletal: Moves all extremities. No obvious deformities. No edema. No calf tenderness.  Skin: Warm and dry.  Neurological:  Alert, awake, and appropriate.  Normal speech.  No acute focal neurological deficits are appreciated.  Psychiatric: Normal affect. Good eye contact. Appropriate in content.     ED Course   Procedures  ED Vital Signs:  Vitals:    01/12/19 0849 01/12/19 0935 01/12/19 0940 01/12/19 0944   BP: (!) 181/100      Pulse: 106 104 101 97   Resp: 20 20 20    Temp: 98.4 °F (36.9 °C)      TempSrc: Oral      SpO2: (!) 90% (!) 91% (!) 94%    Weight: 95.8 kg (211 lb 1.5 oz)      Height: 5' 10" (1.778 m)       01/12/19 0945 01/12/19 0946 01/12/19 1004 01/12/19 1008   BP:  (!) 179/84     Pulse: 95 97 97 93   Resp: 18 20 (!) 29 (!) 22   Temp:       TempSrc:       SpO2: 95% 96% (!) 90% (!) 94%   Weight:       Height:        01/12/19 1105 01/12/19 1155 01/12/19 1215   BP: (!) 142/87  (!) 189/92   Pulse: 100  100   Resp: 20  20   Temp:  98.6 °F (37 °C)    TempSrc:  Oral    SpO2: 96%  95%   Weight:      Height:          Abnormal Lab Results:  Labs Reviewed   CBC W/ AUTO DIFFERENTIAL - Abnormal; Notable for the following components:       Result Value    RBC 3.82 (*)     Hemoglobin 8.8 (*)     Hematocrit 32.1 (*)     MCH 23.0 (*)     MCHC 27.4 (*)     RDW 20.3 (*)     Mono # 1.3 (*)     All other components within " normal limits   B-TYPE NATRIURETIC PEPTIDE - Abnormal; Notable for the following components:     (*)     All other components within normal limits   COMPREHENSIVE METABOLIC PANEL - Abnormal; Notable for the following components:    CO2 30 (*)     Albumin 3.2 (*)     Anion Gap 7 (*)     All other components within normal limits   ISTAT PROCEDURE - Abnormal; Notable for the following components:    POC PCO2 47.9 (*)     POC PO2 53 (*)     POC HCO3 28.1 (*)     POC SATURATED O2 86 (*)     All other components within normal limits   CULTURE, BLOOD   CULTURE, BLOOD   URINALYSIS, REFLEX TO URINE CULTURE    Narrative:     Preferred Collection Type->Urine, Clean Catch   TROPONIN I   LACTIC ACID, PLASMA   PROCALCITONIN        All Lab Results:  Results for orders placed or performed during the hospital encounter of 01/12/19   CBC auto differential   Result Value Ref Range    WBC 8.96 3.90 - 12.70 K/uL    RBC 3.82 (L) 4.60 - 6.20 M/uL    Hemoglobin 8.8 (L) 14.0 - 18.0 g/dL    Hematocrit 32.1 (L) 40.0 - 54.0 %    MCV 84 82 - 98 fL    MCH 23.0 (L) 27.0 - 31.0 pg    MCHC 27.4 (L) 32.0 - 36.0 g/dL    RDW 20.3 (H) 11.5 - 14.5 %    Platelets 188 150 - 350 K/uL    MPV 9.7 9.2 - 12.9 fL    Gran # (ANC) 4.8 1.8 - 7.7 K/uL    Lymph # 2.7 1.0 - 4.8 K/uL    Mono # 1.3 (H) 0.3 - 1.0 K/uL    Eos # 0.2 0.0 - 0.5 K/uL    Baso # 0.05 0.00 - 0.20 K/uL    Gran% 53.4 38.0 - 73.0 %    Lymph% 30.6 18.0 - 48.0 %    Mono% 14.0 4.0 - 15.0 %    Eosinophil% 1.7 0.0 - 8.0 %    Basophil% 0.6 0.0 - 1.9 %    Differential Method Automated    Brain natriuretic peptide   Result Value Ref Range     (H) 0 - 99 pg/mL   Comprehensive metabolic panel   Result Value Ref Range    Sodium 140 136 - 145 mmol/L    Potassium 4.4 3.5 - 5.1 mmol/L    Chloride 103 95 - 110 mmol/L    CO2 30 (H) 23 - 29 mmol/L    Glucose 85 70 - 110 mg/dL    BUN, Bld 13 8 - 23 mg/dL    Creatinine 0.8 0.5 - 1.4 mg/dL    Calcium 8.9 8.7 - 10.5 mg/dL    Total Protein 6.7 6.0 - 8.4  g/dL    Albumin 3.2 (L) 3.5 - 5.2 g/dL    Total Bilirubin 0.4 0.1 - 1.0 mg/dL    Alkaline Phosphatase 75 55 - 135 U/L    AST 23 10 - 40 U/L    ALT 10 10 - 44 U/L    Anion Gap 7 (L) 8 - 16 mmol/L    eGFR if African American >60 >60 mL/min/1.73 m^2    eGFR if non African American >60 >60 mL/min/1.73 m^2   Urinalysis, Reflex to Urine Culture Urine, Clean Catch   Result Value Ref Range    Specimen UA Urine, Clean Catch     Color, UA Yellow Yellow, Straw, Sarah    Appearance, UA Clear Clear    pH, UA 8.0 5.0 - 8.0    Specific Gravity, UA 1.010 1.005 - 1.030    Protein, UA Negative Negative    Glucose, UA Negative Negative    Ketones, UA Negative Negative    Bilirubin (UA) Negative Negative    Occult Blood UA Negative Negative    Nitrite, UA Negative Negative    Urobilinogen, UA Negative <2.0 EU/dL    Leukocytes, UA Negative Negative   Troponin I   Result Value Ref Range    Troponin I <0.006 0.000 - 0.026 ng/mL   Lactic acid, plasma   Result Value Ref Range    Lactate (Lactic Acid) 0.8 0.5 - 2.2 mmol/L   Procalcitonin   Result Value Ref Range    Procalcitonin 0.04 <0.25 ng/mL   ISTAT PROCEDURE   Result Value Ref Range    POC PH 7.376 7.35 - 7.45    POC PCO2 47.9 (H) 35 - 45 mmHg    POC PO2 53 (LL) 80 - 100 mmHg    POC HCO3 28.1 (H) 24 - 28 mmol/L    POC BE 3 -2 to 2 mmol/L    POC SATURATED O2 86 (L) 95 - 100 %    Sample ARTERIAL     Site RR     Allens Test Pass     DelSys Room Air     Mode SPONT     FiO2 21          Imaging Results:  Imaging Results          CT Maxillofacial Without Contrast (Final result)  Result time 01/12/19 10:43:01    Final result by Kee Sibley MD (01/12/19 10:43:01)                 Impression:      Mild mucosal thickening in ethmoid air cells the maxillary sinuses and the sphenoid sinus.  The ostiomeatal complex appears patent bilaterally.  Nasal septal deviation to the left side.  Large right daniele bullosa.  Extensive dental decay.    All CT scans at this facility are performed  using dose  modulation techniques as appropriate to performed exam including the following:  automated exposure control; adjustment of mA and/or kV according to the patients size (this includes techniques or standardized protocols for targeted exams where dose is matched to indication/reason for exam: i.e. extremities or head);  iterative reconstruction technique.      Electronically signed by: Kee Sibley MD  Date:    01/12/2019  Time:    10:43             Narrative:    EXAMINATION:  CT MAXILLOFACIAL WITHOUT CONTRAST    CLINICAL HISTORY:  Mass, lump or swelling, maxface;Pain, maxface;Multiple areas of jaw upper and lower with suspected abscesses;    TECHNIQUE:  Low dose axial images, sagittal and coronal reformations were obtained through the face.  Contrast was not administered.    COMPARISON:  None    FINDINGS:  Nasal septal deviation to the left.  Large right daniele bullosa.  Mild mucosal thickening in the sphenoid sinus ethmoid air cells and maxillary sinuses.  No air-fluid levels.  The frontal sinuses are clear.                               CT Head Without Contrast (Final result)  Result time 01/12/19 10:41:52    Final result by Kee Sibley MD (01/12/19 10:41:52)                 Impression:      No acute intracranial abnormality.  Fluid-filled right-sided mastoid.  Mild mucosal thickening in scattered ethmoid air cells and the sphenoid sinus.    All CT scans at this facility use dose modulation, iterative reconstruction and/or weight based dosing when appropriate to reduce radiation dose to as low as reasonably achievable.      Electronically signed by: Kee Sibley MD  Date:    01/12/2019  Time:    10:41             Narrative:    EXAMINATION:  CT HEAD WITHOUT CONTRAST    CLINICAL HISTORY:  Intermittent new onset hallucination with severe sinusitis;    TECHNIQUE:  Axial CT images of the head were obtained without  contrast.    COMPARISON:  05/08/2018.    FINDINGS:  Ventricles, sulci, and cisterns are symmetric without  evidence of mass effect.  Brain volume and white matter are normal for age.  No intra or extraaxial masses, hemorrhages, abnormal fluid collections or abnormal calcifications. The cranium and extracranial structures are unremarkable.  Fluid-filled mastoid on the right.                               X-Ray Chest PA And Lateral (Final result)  Result time 01/12/19 09:24:24    Final result by Merrill Nichols MD (01/12/19 09:24:24)                 Impression:      No acute process.      Electronically signed by: Merrill Nichols MD  Date:    01/12/2019  Time:    09:24             Narrative:    EXAMINATION:  XR CHEST PA AND LATERAL    CLINICAL HISTORY:  Asthma;    COMPARISON:  08/31/2018.    FINDINGS:  Normal size heart.  No congestion.  Lungs are clear and well expanded with a degree of flattening hemidiaphragms, question COPD.  Correlate.                               Point of care ultrasound was performed to evaluate abnormal breathing pattern, volume status, cardiac status, evaluation for  along with shortness of breath.    Patient was in supine position    Pulmonary ultrasound was evaluated in multiple sites including anterior chest bilaterally of the lungs bilaterally including a right and left lower chest was evaluated for pneumothorax which was negative. A lines were positive in the upper chest.  B lines were positive on lateral side of lower chest, left greater than right consistent with interstitial fibrosis. No evidence of pneumothorax.  No Pleural effusion detected   Positive for lower lobe atelectasis or pneumonia.      Point of care ultrasound of the heart shows no evidence of pericardial effusion.  Normal LV and RV function.  Normal valvular heart  function.    Point of care ultrasound of the inferior vena cava shows distended inferior vena cava patient was asked to sniff which shows no fluid overload ad and CVP of about 5-10    Point of care ultrasound of the abdomen was also performed no evidence of free  fluid in the abdomen.  Both kidneys looked and appeared normal without any hydronephrosis.  Bladder was collapsed.        The EKG was ordered, reviewed, and independently interpreted by the ED provider.  Interpretation time: 9:19  Rate: 100 BPM  Rhythm: sinus rhythm with premature atrial complexes  Interpretation: Possible Left atrial enlargement. Pulmonary hypertension. No STEMI.                 The Emergency Provider reviewed the vital signs and test results, which are outlined above.     ED Discussion     12:15 PM: Re-evaluated pt. Pt is resting comfortably and is in no acute distress. D/w pt all pertinent results. D/w pt any concerns expressed at this time. Answered all questions. Pt expresses understanding at this time.    12:24 PM: Discussed with pt all pertinent ED information and results. Discussed pt dx of and plan of tx. Advised pt to continue to take clindamycin and f/u with PCP. Gave pt all f/u and return to the ED instructions. All questions and concerns were addressed at this time. Pt expresses understanding of information and instructions, and is comfortable with plan to discharge. Pt is stable for discharge.    I discussed with patient and/or family/caretaker that evaluation in the ED does not suggest any emergent or life threatening medical conditions requiring immediate intervention beyond what was provided in the ED, and I believe patient is safe for discharge.  Regardless, an unremarkable evaluation in the ED does not preclude the development or presence of a serious of life threatening condition. As such, patient was instructed to return immediately for any worsening or change in current symptoms.      ED Medication(s):  Medications   albuterol-ipratropium 2.5 mg-0.5 mg/3 mL nebulizer solution 3 mL (3 mLs Nebulization Given 1/12/19 5397)   methylPREDNISolone sodium succinate injection 125 mg (125 mg Intravenous Given 1/12/19 5804)   ketorolac injection 30 mg (30 mg Intravenous Given 1/12/19  0956)   sodium chloride 0.9% bolus 1,000 mL (0 mLs Intravenous Stopped 1/12/19 1210)     Current Discharge Medication List      START taking these medications    Details   oxyCODONE-acetaminophen (PERCOCET) 5-325 mg per tablet Take 1 tablet by mouth every 4 (four) hours as needed for Pain.  Qty: 30 tablet, Refills: 0               Follow-up Information     Go to  [facility] - .    Specialty:  Emergency Medicine  Why:  If symptoms worsen  Contact information:  [address] [place] [address] [phone]           Jazmin Antoine MD. Schedule an appointment as soon as possible for a visit in 1 week.    Specialty:  Pulmonary Disease  Why:  lung problems  Contact information:  [address] [place] [address] [phone]                      Medical Decision Making     Medical Decision Making:   Clinical Tests:   Lab Tests: Ordered and Reviewed  Radiological Study: Ordered and Reviewed  Medical Tests: Ordered and Reviewed             Scribe Attestation:   Scribe #1: I performed the above scribed service and the documentation accurately describes the services I performed. I attest to the accuracy of the note. 01/12/2019 12:25 PM    Attending:   Physician Attestation Statement for Scribe #1: I, Isael Roberts MD, personally performed the services described in this documentation, as scribed by nAa Richards, in my presence, and it is both accurate and complete.           Clinical Impression       ICD-10-CM ICD-9-CM   1. Acute recurrent maxillary sinusitis J01.01 461.0   2. COPD exacerbation J44.1 491.21   3. Essential hypertension I10 401.9   4. Bronchitis J40 490   5. Tobacco dependence due to cigarettes F17.210 305.1   6. Facial swelling R22.0 784.2   7. SOB (shortness of breath) R06.02 786.05   8. Periodontitis K05.30 523.40   9. Dental abscess K04.7 522.5   10. Mastoiditis of right side H70.91 383.9   11. Hypoxemia R09.02 799.02     Patient will take  clindamycin for sinusitis and mastoiditis.    Nebulizers for COPD exacerbation avoid steroids.    Pulmonary consultation as an outpatient.    Dental consultation as an outpatient.      Disposition:   Disposition: Discharged  Condition: Stable               Isael Roberts MD  01/12/19 9900

## 2019-01-14 ENCOUNTER — TELEPHONE (OUTPATIENT)
Dept: PULMONOLOGY | Facility: CLINIC | Age: 64
End: 2019-01-14

## 2019-01-14 NOTE — TELEPHONE ENCOUNTER
Called and LVM for patient to call back to confirm hospital f/u appt----- Message from Gillian Arndt sent at 1/14/2019  9:47 AM CST -----  Contact: ms pinzon-wife  pls work pt in asap for 1/12 [facility] [place] [facility], dr esparza recommended this ..[phone]

## 2019-01-17 LAB
BACTERIA BLD CULT: NORMAL
BACTERIA BLD CULT: NORMAL

## 2019-02-07 ENCOUNTER — OFFICE VISIT (OUTPATIENT)
Dept: PULMONOLOGY | Facility: CLINIC | Age: 64
End: 2019-02-07
Payer: MEDICAID

## 2019-02-07 ENCOUNTER — TELEPHONE (OUTPATIENT)
Dept: PULMONOLOGY | Facility: CLINIC | Age: 64
End: 2019-02-07

## 2019-02-07 VITALS
RESPIRATION RATE: 18 BRPM | HEART RATE: 83 BPM | HEIGHT: 70 IN | OXYGEN SATURATION: 95 % | DIASTOLIC BLOOD PRESSURE: 62 MMHG | SYSTOLIC BLOOD PRESSURE: 140 MMHG | BODY MASS INDEX: 28.85 KG/M2 | WEIGHT: 201.5 LBS

## 2019-02-07 DIAGNOSIS — J41.1 CHRONIC BRONCHITIS, MUCOPURULENT: ICD-10-CM

## 2019-02-07 DIAGNOSIS — R51.9 MORNING HEADACHE: ICD-10-CM

## 2019-02-07 DIAGNOSIS — J44.1 COPD EXACERBATION: ICD-10-CM

## 2019-02-07 DIAGNOSIS — R06.83 SNORING: ICD-10-CM

## 2019-02-07 DIAGNOSIS — R06.89 GASPING FOR BREATH: ICD-10-CM

## 2019-02-07 DIAGNOSIS — F17.200 TOBACCO DEPENDENCE: ICD-10-CM

## 2019-02-07 DIAGNOSIS — R53.82 CHRONIC FATIGUE: ICD-10-CM

## 2019-02-07 DIAGNOSIS — J44.9 COPD, SEVERE: Primary | ICD-10-CM

## 2019-02-07 DIAGNOSIS — R29.818 SUSPECTED SLEEP APNEA: ICD-10-CM

## 2019-02-07 DIAGNOSIS — Z23 NEED FOR INFLUENZA VACCINATION: ICD-10-CM

## 2019-02-07 PROCEDURE — 99215 PR OFFICE/OUTPT VISIT, EST, LEVL V, 40-54 MIN: ICD-10-PCS | Mod: S$PBB,,, | Performed by: INTERNAL MEDICINE

## 2019-02-07 PROCEDURE — 99213 OFFICE O/P EST LOW 20 MIN: CPT | Mod: PBBFAC | Performed by: INTERNAL MEDICINE

## 2019-02-07 PROCEDURE — 99999 PR PBB SHADOW E&M-EST. PATIENT-LVL III: CPT | Mod: PBBFAC,,, | Performed by: INTERNAL MEDICINE

## 2019-02-07 PROCEDURE — 99999 PR PBB SHADOW E&M-EST. PATIENT-LVL III: ICD-10-PCS | Mod: PBBFAC,,, | Performed by: INTERNAL MEDICINE

## 2019-02-07 PROCEDURE — 99215 OFFICE O/P EST HI 40 MIN: CPT | Mod: S$PBB,,, | Performed by: INTERNAL MEDICINE

## 2019-02-07 RX ORDER — GABAPENTIN 300 MG/1
300 CAPSULE ORAL 3 TIMES DAILY PRN
Refills: 2 | COMMUNITY
Start: 2019-01-28

## 2019-02-07 RX ORDER — TRAZODONE HYDROCHLORIDE 100 MG/1
100 TABLET ORAL NIGHTLY
Refills: 2 | COMMUNITY
Start: 2019-01-28

## 2019-02-07 RX ORDER — PREDNISONE 10 MG/1
TABLET ORAL
Qty: 21 TABLET | Refills: 0 | Status: SHIPPED | OUTPATIENT
Start: 2019-02-07

## 2019-02-07 RX ORDER — ROFLUMILAST 500 UG/1
500 TABLET ORAL DAILY
Qty: 30 TABLET | Refills: 6 | Status: SHIPPED | OUTPATIENT
Start: 2019-02-07 | End: 2019-03-09

## 2019-02-07 RX ORDER — NICOTINE 7MG/24HR
1 PATCH, TRANSDERMAL 24 HOURS TRANSDERMAL DAILY
Qty: 14 PATCH | Refills: 0 | Status: SHIPPED | OUTPATIENT
Start: 2019-02-07

## 2019-02-07 RX ORDER — ACETAMINOPHEN, DIPHENHYDRAMINE HCL, PHENYLEPHRINE HCL 325; 25; 5 MG/1; MG/1; MG/1
TABLET ORAL
COMMUNITY

## 2019-02-07 RX ORDER — ALBUTEROL SULFATE 90 UG/1
2 AEROSOL, METERED RESPIRATORY (INHALATION) EVERY 6 HOURS PRN
Qty: 18 G | Refills: 6 | Status: SHIPPED | OUTPATIENT
Start: 2019-02-07 | End: 2020-02-07

## 2019-02-07 RX ORDER — IBUPROFEN 200 MG
1 TABLET ORAL DAILY
Qty: 21 PATCH | Refills: 0 | Status: SHIPPED | OUTPATIENT
Start: 2019-02-07

## 2019-02-07 RX ORDER — NAPROXEN SODIUM 220 MG/1
81 TABLET, FILM COATED ORAL
COMMUNITY
Start: 2018-05-31 | End: 2019-05-31

## 2019-02-07 RX ORDER — IBUPROFEN 200 MG
1 TABLET ORAL DAILY
Qty: 14 PATCH | Refills: 0 | Status: SHIPPED | OUTPATIENT
Start: 2019-02-07

## 2019-02-07 NOTE — PROGRESS NOTES
Pulmonary Outpatient Follow Up Visit     Subjective:    This patient is new to me.  [facility] chart was reviewed.  Patient was being seen by Pulmonary Dr Nieves Caballero.       Patient ID: Filemon Aranda is a 63 y.o. male.    Chief Complaint: COPD; Pneumonia; and Nicotine Dependence      HPI      63-year-old male patient presenting for hospital follow-up.  Last month he was admitted to the emergency room for COPD exacerbation and sinusitis.  He was prescribed clindamycin.    Complains of chronic cough with yellowish and greenish sputum production for last 7 years, wheezing, shortness of breath on exertion.    Smokes 2 packs per day for about 49 years.  He has tried to quit smoking recently with Chantix however he did quit because of suicidal thoughts.    He worked in construction.    He does complain of snoring, chronic fatigue, gasping for air and apnea during sleep.      STOP - BANG Questionnaire:     1. Snoring : Do you snore loudly ?    Yes    2. Tired : Do you often feel tired, fatigued, or sleepy during daytime? Yes    3. Observed: Has anyone observed you stop breathing during your sleep?   Yes     4. Blood pressure : Do you have or are you being treated for high blood pressure?   Yes    5. BMI :BMI more than 35 kg/m2?   No    6. Age : Age over 50 yr old?   Yes    7. Neck circumference: Neck circumference greater than 40 cm?   Yes.  19 in    8. Gender: Gender male?   Yes    STOP BANG SCORE 7    High risk of NELSON: Yes 5 - 8  Intermediate risk of NELSON: Yes 3 - 4  Low risk of NELSON: Yes 0 - 2      References:   STOP Questionnaire   A Tool to Screen Patients for Obstructive Sleep Apnea: BOONE Infante.R.C.P.C., KAVIN Mcclain.B.B.S., Evi Brewster M.D.,Digna Mason, Ph.D., Philippe Winston M.B.B.S.,_ Shari Jorgensen.,_ Meghan Clemente M.D., BOONE Gerber.R.C.P.C.; Anesthesiology 2008; 108:812-21 Copyright © 2008, the American Society of  Anesthesiologists, Inc. [employer]      Review of Systems   Constitutional: Positive for fatigue. Negative for fever and chills.   HENT: Negative for nosebleeds.    Eyes: Negative for redness.   Respiratory: Positive for apnea, snoring, cough, sputum production, shortness of breath, wheezing, dyspnea on extertion and use of rescue inhaler. Negative for choking.    Genitourinary: Negative for hematuria.   Endocrine: Negative for cold intolerance.    Musculoskeletal: Positive for arthralgias.   Gastrointestinal: Negative for vomiting.   Neurological: Negative for syncope.   Hematological: Negative for adenopathy.   Psychiatric/Behavioral: Negative for confusion.       Outpatient Encounter Medications as of 2/7/2019   Medication Sig Dispense Refill    aspirin 81 MG Chew Take 81 mg by mouth.      carvedilol (COREG) 25 MG tablet Take 25 mg by mouth 2 (two) times daily with meals.      clopidogrel (PLAVIX) 75 mg tablet Take 75 mg by mouth once daily.      gabapentin (NEURONTIN) 300 MG capsule Take 300 mg by mouth 3 (three) times daily as needed.  2    lisinopril-hydrochlorothiazide (PRINZIDE,ZESTORETIC) 20-25 mg Tab Take 1 tablet by mouth once daily.      lovastatin (MEVACOR) 40 MG tablet Take 40 mg by mouth every evening.      melatonin 10 mg Tab Take by mouth.      traZODone (DESYREL) 100 MG tablet Take 100 mg by mouth nightly. at bedtime.  2    umeclidinium-vilanterol (ANORO ELLIPTA) 62.5-25 mcg/actuation DsDv Inhale 1 puff into the lungs once daily. Controller 60 each 11    [DISCONTINUED] albuterol (PROVENTIL) 2.5 mg /3 mL (0.083 %) nebulizer solution Take 2.5 mg by nebulization every 8 (eight) hours as needed for Wheezing. Rescue      [DISCONTINUED] b complex vitamins tablet Take 1 tablet by mouth once daily.      [DISCONTINUED] umeclidinium-vilanterol (ANORO ELLIPTA) 62.5-25 mcg/actuation DsDv Inhale 62.5 mcg into the lungs once daily. Controller      albuterol  "(PROVENTIL/VENTOLIN HFA) 90 mcg/actuation inhaler Inhale 2 puffs into the lungs every 6 (six) hours as needed for Wheezing. Rescue 18 g 6    nicotine (NICODERM CQ) 14 mg/24 hr Place 1 patch onto the skin once daily. 14 patch 0    nicotine (NICODERM CQ) 21 mg/24 hr Place 1 patch onto the skin once daily. 21 patch 0    nicotine (NICODERM CQ) 7 mg/24 hr Place 1 patch onto the skin once daily. 14 patch 0    predniSONE (DELTASONE) 10 MG tablet Prednisone 40 mg daily for 3 days then 20 mg daily for 3 days then 10 mg daily for 3 days 21 tablet 0    roflumilast (DALIRESP) 500 mcg Tab Take 1 tablet (500 mcg total) by mouth once daily. 30 tablet 6    [DISCONTINUED] ipratropium-albuterol (COMBIVENT)  mcg/actuation inhaler Inhale 1 puff into the lungs every 6 (six) hours as needed for Wheezing. Rescue 4 g 11    [DISCONTINUED] oxyCODONE-acetaminophen (PERCOCET) 5-325 mg per tablet Take 1 tablet by mouth every 4 (four) hours as needed for Pain. 30 tablet 0    [DISCONTINUED] pantoprazole (PROTONIX) 40 MG tablet Take 40 mg by mouth once daily.      [DISCONTINUED] predniSONE (DELTASONE) 20 MG tablet 3 daily x 3 days, 2 daily x 3 days, 1 daily x 3 days, 1/2 daily x 4 days. 20 tablet 0    [DISCONTINUED] roflumilast 500 mcg Tab Take 1 tablet (500 mcg total) by mouth once daily. 30 tablet 11    [DISCONTINUED] zolpidem (AMBIEN) 5 MG Tab Take 1 tablet (5 mg total) by mouth nightly as needed. 20 tablet 0     No facility-administered encounter medications on file as of 2/7/2019.        Objective:     Vital Signs (Most Recent)  Vital Signs  Pulse: 83  Resp: 18  SpO2: 95 %  BP: (!) 140/62  Height and Weight  Height: 5' 10" (177.8 cm)  Weight: 91.4 kg (201 lb 8 oz)  BSA (Calculated - sq m): 2.12 sq meters  BMI (Calculated): 29  Weight in (lb) to have BMI = 25: 173.9]  Wt Readings from Last 2 Encounters:   02/07/19 91.4 kg (201 lb 8 oz)   01/12/19 95.8 kg (211 lb 1.5 oz)       Physical Exam   Constitutional: He is oriented to " person, place, and time. He appears well-developed.   HENT:   Head: Normocephalic.   Mouth/Throat: Mallampati Score: III.   Pink puffer   Neck: Neck supple.   Neck circumference 19 in   Cardiovascular: Normal rate and regular rhythm.   Pulmonary/Chest: Normal expansion and effort normal. No stridor. No respiratory distress. He has decreased breath sounds. He has wheezes. He exhibits no tenderness.   Diffuse bilateral wheezing   Abdominal: Soft.   Musculoskeletal: He exhibits no tenderness.   Lymphadenopathy:     He has no cervical adenopathy.   Neurological: He is alert and oriented to person, place, and time. Gait normal.   Skin: Skin is warm. Nails show clubbing.   Psychiatric: He has a normal mood and affect. His behavior is normal. Judgment and thought content normal.   Nursing note and vitals reviewed.      Laboratory  Lab Results   Component Value Date    WBC 8.96 01/12/2019    RBC 3.82 (L) 01/12/2019    HGB 8.8 (L) 01/12/2019    HCT 32.1 (L) 01/12/2019    MCV 84 01/12/2019    MCH 23.0 (L) 01/12/2019    MCHC 27.4 (L) 01/12/2019    RDW 20.3 (H) 01/12/2019     01/12/2019    MPV 9.7 01/12/2019    GRAN 4.8 01/12/2019    GRAN 53.4 01/12/2019    LYMPH 2.7 01/12/2019    LYMPH 30.6 01/12/2019    MONO 1.3 (H) 01/12/2019    MONO 14.0 01/12/2019    EOS 0.2 01/12/2019    BASO 0.05 01/12/2019    EOSINOPHIL 1.7 01/12/2019    BASOPHIL 0.6 01/12/2019       BMP  Lab Results   Component Value Date     01/12/2019    K 4.4 01/12/2019     01/12/2019    CO2 30 (H) 01/12/2019    BUN 13 01/12/2019    CREATININE 0.8 01/12/2019    CALCIUM 8.9 01/12/2019    ANIONGAP 7 (L) 01/12/2019    ESTGFRAFRICA >60 01/12/2019    EGFRNONAA >60 01/12/2019    AST 23 01/12/2019    ALT 10 01/12/2019    PROT 6.7 01/12/2019       Lab Results   Component Value Date     (H) 01/12/2019    BNP 49 05/08/2018    BNP 52 08/05/2017    BNP 52 08/05/2017       Lab Results   Component Value Date    TSH 2.099 08/05/2017       No results found  for: SEDRATE    No results found for: CRP      Diagnostic Results:  I have personally reviewed today the following studies:    Chest x-ray Hyperinflated lungs.  COPD  CT chest No acute findings.  Hyperinflated lungs.  Assessment/Plan:   COPD, severe  -     umeclidinium-vilanterol (ANORO ELLIPTA) 62.5-25 mcg/actuation DsDv; Inhale 1 puff into the lungs once daily. Controller  Dispense: 60 each; Refill: 11  -     roflumilast (DALIRESP) 500 mcg Tab; Take 1 tablet (500 mcg total) by mouth once daily.  Dispense: 30 tablet; Refill: 6  -     albuterol (PROVENTIL/VENTOLIN HFA) 90 mcg/actuation inhaler; Inhale 2 puffs into the lungs every 6 (six) hours as needed for Wheezing. Rescue  Dispense: 18 g; Refill: 6    Chronic bronchitis, mucopurulent  -     umeclidinium-vilanterol (ANORO ELLIPTA) 62.5-25 mcg/actuation DsDv; Inhale 1 puff into the lungs once daily. Controller  Dispense: 60 each; Refill: 11  -     roflumilast (DALIRESP) 500 mcg Tab; Take 1 tablet (500 mcg total) by mouth once daily.  Dispense: 30 tablet; Refill: 6  -     albuterol (PROVENTIL/VENTOLIN HFA) 90 mcg/actuation inhaler; Inhale 2 puffs into the lungs every 6 (six) hours as needed for Wheezing. Rescue  Dispense: 18 g; Refill: 6    Tobacco dependence  -     nicotine (NICODERM CQ) 21 mg/24 hr; Place 1 patch onto the skin once daily.  Dispense: 21 patch; Refill: 0  -     nicotine (NICODERM CQ) 14 mg/24 hr; Place 1 patch onto the skin once daily.  Dispense: 14 patch; Refill: 0  -     nicotine (NICODERM CQ) 7 mg/24 hr; Place 1 patch onto the skin once daily.  Dispense: 14 patch; Refill: 0    Suspected sleep apnea  -     Home Sleep Studies; Future    Snoring  -     Home Sleep Studies; Future    Chronic fatigue  -     Home Sleep Studies; Future    Morning headache  -     Home Sleep Studies; Future    Gasping for breath  -     Home Sleep Studies; Future    COPD exacerbation  -     predniSONE (DELTASONE) 10 MG tablet; Prednisone 40 mg daily for 3 days then 20 mg  daily for 3 days then 10 mg daily for 3 days  Dispense: 21 tablet; Refill: 0    Need for influenza vaccination      Continue albuterol p.r.n. continue Anoro 1 puff daily.  Patient does not want inhaled steroids due to severe mouth rash/ulcers previously.  Start Daliresp 1 tablet daily.  Patient instructed about the possible GI side effects.    Patient had a previous order for PFT and 6 min walking test on his visit to our office in August.  He was then evaluated by our nurse practitioner.    Consult patient 3-5 minutes about smoking cessation.  He is agreeable to try nicotine patches.    Up-to-date on pneumococcal vaccines.  Advised patient to receive influenza vaccine.    MEDICAL DECISION MAKING: Moderate to high complexity.  Overall, the multiple problems listed are of moderate to high severity that may impact quality of life and activities of daily living. Side effects of medications, treatment plan as well as options and alternatives reviewed and discussed with patient. There was counseling of patient concerning these issues.     Total time spent in face to face counseling and coordination of care - 40  minutes over 50% of time was used in discussion of prognosis, risks, benefits of treatment, instructions and compliance with regimen . Discussion with other physicians or health care providers (DME, NP, pharmacy, respiratory therapy) occurred.          Follow-up in about 6 weeks (around 3/21/2019).    This note was prepared using voice recognition system and is likely to have sound alike errors that may have been overlooked even after proof reading.  Please call me with any questions    Discussed diagnosis, its evaluation, treatment and usual course. All questions answered.      Jazmin Antoine MD

## 2019-02-07 NOTE — TELEPHONE ENCOUNTER
----- Message from Johan Barry sent at 2/7/2019  1:20 PM CST -----  [facility]

## 2019-03-21 ENCOUNTER — OFFICE VISIT (OUTPATIENT)
Dept: PULMONOLOGY | Facility: CLINIC | Age: 64
End: 2019-03-21
Payer: MEDICAID

## 2019-03-21 VITALS
BODY MASS INDEX: 28.21 KG/M2 | RESPIRATION RATE: 20 BRPM | HEART RATE: 89 BPM | SYSTOLIC BLOOD PRESSURE: 142 MMHG | OXYGEN SATURATION: 98 % | HEIGHT: 70 IN | DIASTOLIC BLOOD PRESSURE: 80 MMHG | WEIGHT: 197.06 LBS

## 2019-03-21 DIAGNOSIS — J41.1 CHRONIC BRONCHITIS, MUCOPURULENT: ICD-10-CM

## 2019-03-21 DIAGNOSIS — J44.9 COPD, SEVERE: Primary | ICD-10-CM

## 2019-03-21 DIAGNOSIS — F17.200 TOBACCO DEPENDENCE: ICD-10-CM

## 2019-03-21 DIAGNOSIS — R06.83 SNORING: ICD-10-CM

## 2019-03-21 DIAGNOSIS — R29.818 SUSPECTED SLEEP APNEA: ICD-10-CM

## 2019-03-21 PROCEDURE — 99999 PR PBB SHADOW E&M-EST. PATIENT-LVL IV: ICD-10-PCS | Mod: PBBFAC,,, | Performed by: INTERNAL MEDICINE

## 2019-03-21 PROCEDURE — 99214 OFFICE O/P EST MOD 30 MIN: CPT | Mod: PBBFAC | Performed by: INTERNAL MEDICINE

## 2019-03-21 PROCEDURE — 99999 PR PBB SHADOW E&M-EST. PATIENT-LVL IV: CPT | Mod: PBBFAC,,, | Performed by: INTERNAL MEDICINE

## 2019-03-21 PROCEDURE — 99214 OFFICE O/P EST MOD 30 MIN: CPT | Mod: S$PBB,,, | Performed by: INTERNAL MEDICINE

## 2019-03-21 PROCEDURE — 99214 PR OFFICE/OUTPT VISIT, EST, LEVL IV, 30-39 MIN: ICD-10-PCS | Mod: S$PBB,,, | Performed by: INTERNAL MEDICINE

## 2019-03-21 NOTE — PROGRESS NOTES
Pulmonary Outpatient Follow Up Visit     Subjective:       Patient ID: Filemon Aranda is a 63 y.o. male.    Chief Complaint: COPD      HPI      63-year-old male patient presenting for follow-up.  Initially evaluated early February 2019 for hospital follow-up.    Known with severe COPD on Anoro.  Did not tolerate ICS due to oral rash and odynophagia.    About 100 pack year smoking history.  Had suicidal thoughts with Chantix.  I prescribed him on last visit nicotine patches.  He did not have his PFT, 6 min walking test, or home sleep study done yet.    Using nicotine patches and now smoking 3 cigarettes a day.    He worked in construction.     He does complain of snoring, chronic fatigue, gasping for air and apnea during sleep.         Review of Systems   Constitutional: Negative for fever and chills.   HENT: Negative for nosebleeds.    Eyes: Negative for redness.   Respiratory: Positive for snoring, cough, sputum production, shortness of breath, wheezing, dyspnea on extertion, use of rescue inhaler and somnolence. Negative for choking.    Cardiovascular: Negative for chest pain.   Genitourinary: Negative for hematuria.   Endocrine: Negative for cold intolerance.    Musculoskeletal: Positive for arthralgias.   Skin: Negative for rash.   Gastrointestinal: Negative for vomiting.   Neurological: Negative for syncope.   Hematological: Negative for adenopathy.   Psychiatric/Behavioral: Positive for sleep disturbance. Negative for confusion.       Outpatient Encounter Medications as of 3/21/2019   Medication Sig Dispense Refill    albuterol (PROVENTIL/VENTOLIN HFA) 90 mcg/actuation inhaler Inhale 2 puffs into the lungs every 6 (six) hours as needed for Wheezing. Rescue 18 g 6    aspirin 81 MG Chew Take 81 mg by mouth.      carvedilol (COREG) 25 MG tablet Take 25 mg by mouth 2 (two) times daily with meals.      clopidogrel (PLAVIX) 75 mg tablet Take 75 mg by mouth once daily.    "   gabapentin (NEURONTIN) 300 MG capsule Take 300 mg by mouth 3 (three) times daily as needed.  2    lisinopril-hydrochlorothiazide (PRINZIDE,ZESTORETIC) 20-25 mg Tab Take 1 tablet by mouth once daily.      lovastatin (MEVACOR) 40 MG tablet Take 40 mg by mouth every evening.      melatonin 10 mg Tab Take by mouth.      nicotine (NICODERM CQ) 14 mg/24 hr Place 1 patch onto the skin once daily. 14 patch 0    nicotine (NICODERM CQ) 21 mg/24 hr Place 1 patch onto the skin once daily. 21 patch 0    nicotine (NICODERM CQ) 7 mg/24 hr Place 1 patch onto the skin once daily. 14 patch 0    predniSONE (DELTASONE) 10 MG tablet Prednisone 40 mg daily for 3 days then 20 mg daily for 3 days then 10 mg daily for 3 days 21 tablet 0    traZODone (DESYREL) 100 MG tablet Take 100 mg by mouth nightly. at bedtime.  2    [DISCONTINUED] umeclidinium-vilanterol (ANORO ELLIPTA) 62.5-25 mcg/actuation DsDv Inhale 1 puff into the lungs once daily. Controller 60 each 11    tiotropium-olodaterol (STIOLTO RESPIMAT) 2.5-2.5 mcg/actuation Mist Inhale 1 puff into the lungs once daily. Controller 1 g 5     No facility-administered encounter medications on file as of 3/21/2019.        Objective:     Vital Signs (Most Recent)  Vital Signs  Pulse: 89  Resp: 20  SpO2: 98 %  BP: (!) 142/80  Height and Weight  Height: 5' 10" (177.8 cm)  Weight: 89.4 kg (197 lb 1.5 oz)  BSA (Calculated - sq m): 2.1 sq meters  BMI (Calculated): 28.3  Weight in (lb) to have BMI = 25: 173.9]  Wt Readings from Last 2 Encounters:   03/21/19 89.4 kg (197 lb 1.5 oz)   02/07/19 91.4 kg (201 lb 8 oz)       Physical Exam   Constitutional: He is oriented to person, place, and time. He appears well-developed.   HENT:   Head: Normocephalic.   Neck: Neck supple.   Cardiovascular: Normal rate and regular rhythm.   Pulmonary/Chest: Normal expansion and effort normal. No stridor. No respiratory distress. He has decreased breath sounds. He has wheezes. He has rhonchi. He exhibits " no tenderness.   Abdominal: Soft.   Musculoskeletal: He exhibits no tenderness.   Lymphadenopathy:     He has no cervical adenopathy.   Neurological: He is alert and oriented to person, place, and time. Gait normal.   Skin: Skin is warm. No cyanosis. Nails show clubbing.   Psychiatric: He has a normal mood and affect. His behavior is normal. Judgment and thought content normal.   Nursing note and vitals reviewed.      Laboratory  Lab Results   Component Value Date    WBC 8.96 01/12/2019    RBC 3.82 (L) 01/12/2019    HGB 8.8 (L) 01/12/2019    HCT 32.1 (L) 01/12/2019    MCV 84 01/12/2019    MCH 23.0 (L) 01/12/2019    MCHC 27.4 (L) 01/12/2019    RDW 20.3 (H) 01/12/2019     01/12/2019    MPV 9.7 01/12/2019    GRAN 4.8 01/12/2019    GRAN 53.4 01/12/2019    LYMPH 2.7 01/12/2019    LYMPH 30.6 01/12/2019    MONO 1.3 (H) 01/12/2019    MONO 14.0 01/12/2019    EOS 0.2 01/12/2019    BASO 0.05 01/12/2019    EOSINOPHIL 1.7 01/12/2019    BASOPHIL 0.6 01/12/2019       BMP  Lab Results   Component Value Date     01/12/2019    K 4.4 01/12/2019     01/12/2019    CO2 30 (H) 01/12/2019    BUN 13 01/12/2019    CREATININE 0.8 01/12/2019    CALCIUM 8.9 01/12/2019    ANIONGAP 7 (L) 01/12/2019    ESTGFRAFRICA >60 01/12/2019    EGFRNONAA >60 01/12/2019    AST 23 01/12/2019    ALT 10 01/12/2019    PROT 6.7 01/12/2019       Lab Results   Component Value Date     (H) 01/12/2019    BNP 49 05/08/2018    BNP 52 08/05/2017    BNP 52 08/05/2017       Lab Results   Component Value Date    TSH 2.099 08/05/2017       No results found for: SEDRATE    No results found for: CRP      Diagnostic Results:  I have personally reviewed today the following studies:  Chest x-ray  January 2019 Hyperinflated lungs.  COPD  CT chest August 2018  No acute findings.  Hyperinflated lungs    Assessment/Plan:   COPD, severe  -     tiotropium-olodaterol (STIOLTO RESPIMAT) 2.5-2.5 mcg/actuation Mist; Inhale 1 puff into the lungs once daily.  Controller  Dispense: 1 g; Refill: 5    Chronic bronchitis, mucopurulent    Tobacco dependence    Suspected sleep apnea    Snoring    Continue albuterol p.r.n. received letter from insurance that Anoro will not be covered.    Start Stiolto Respimat.    Counseled again 3-5 minutes about smoking cessation, patient continuing effort to quit smoking and using nicotine patches.    Arrange PFT and 6 min walking test.    Patient to call Sleep Lab regarding having home sleep study performed.      Follow-up in about 3 months (around 6/21/2019).    This note was prepared using voice recognition system and is likely to have sound alike errors that may have been overlooked even after proof reading.  Please call me with any questions    Discussed diagnosis, its evaluation, treatment and usual course. All questions answered.      Jazmin Antoine MD

## 2019-05-30 DIAGNOSIS — J44.9 COPD, SEVERE: Primary | ICD-10-CM

## 2019-08-29 ENCOUNTER — TELEPHONE (OUTPATIENT)
Dept: PULMONOLOGY | Facility: CLINIC | Age: 64
End: 2019-08-29

## 2019-10-24 DIAGNOSIS — J44.9 COPD, SEVERE: ICD-10-CM

## 2019-10-24 RX ORDER — TIOTROPIUM BROMIDE AND OLODATEROL 3.124; 2.736 UG/1; UG/1
SPRAY, METERED RESPIRATORY (INHALATION)
Qty: 4 G | Refills: 11 | Status: SHIPPED | OUTPATIENT
Start: 2019-10-24

## 2020-02-07 NOTE — ED NOTES
In bed resting,aaox3,skin warm dry to touch,equal bilateral chest rise and fall,side rails up x 2,bed locked and in low position,C-monitor on and recording,instructed to call with any needs.    oral